# Patient Record
Sex: FEMALE | Race: WHITE | NOT HISPANIC OR LATINO | Employment: OTHER | URBAN - NONMETROPOLITAN AREA
[De-identification: names, ages, dates, MRNs, and addresses within clinical notes are randomized per-mention and may not be internally consistent; named-entity substitution may affect disease eponyms.]

---

## 2017-01-02 ENCOUNTER — HOSPITAL ENCOUNTER (OUTPATIENT)
Dept: GENERAL RADIOLOGY | Facility: HOSPITAL | Age: 52
Discharge: HOME | End: 2017-01-02
Attending: INTERNAL MEDICINE

## 2017-01-02 ENCOUNTER — TRANSCRIBE ORDERS (OUTPATIENT)
Dept: ADMINISTRATIVE | Facility: HOSPITAL | Age: 52
End: 2017-01-02

## 2017-01-02 DIAGNOSIS — M54.5 LOW BACK PAIN, UNSPECIFIED BACK PAIN LATERALITY, UNSPECIFIED CHRONICITY, WITH SCIATICA PRESENCE UNSPECIFIED: Primary | ICD-10-CM

## 2017-01-02 DIAGNOSIS — M54.5 LOW BACK PAIN, UNSPECIFIED BACK PAIN LATERALITY, UNSPECIFIED CHRONICITY, WITH SCIATICA PRESENCE UNSPECIFIED: ICD-10-CM

## 2017-01-03 ENCOUNTER — HOSPITAL ENCOUNTER (OUTPATIENT)
Dept: GENERAL RADIOLOGY | Facility: HOSPITAL | Age: 52
Discharge: HOME | End: 2017-01-03
Attending: INTERNAL MEDICINE | Admitting: INTERNAL MEDICINE

## 2017-01-03 DIAGNOSIS — M54.5 LOW BACK PAIN, UNSPECIFIED BACK PAIN LATERALITY, UNSPECIFIED CHRONICITY, WITH SCIATICA PRESENCE UNSPECIFIED: ICD-10-CM

## 2017-01-03 PROCEDURE — 72220 X-RAY EXAM SACRUM TAILBONE: CPT | Performed by: RADIOLOGY

## 2017-01-03 PROCEDURE — 72110 X-RAY EXAM L-2 SPINE 4/>VWS: CPT | Performed by: RADIOLOGY

## 2017-01-03 PROCEDURE — 72110 X-RAY EXAM L-2 SPINE 4/>VWS: CPT

## 2017-01-03 PROCEDURE — 72220 X-RAY EXAM SACRUM TAILBONE: CPT

## 2017-01-09 ENCOUNTER — TRANSCRIBE ORDERS (OUTPATIENT)
Dept: ADMINISTRATIVE | Facility: HOSPITAL | Age: 52
End: 2017-01-09

## 2017-01-09 DIAGNOSIS — S30.0XXA COCCYX CONTUSION, INITIAL ENCOUNTER: ICD-10-CM

## 2017-01-09 DIAGNOSIS — S30.0XXA PELVIC CONTUSION, INITIAL ENCOUNTER: Primary | ICD-10-CM

## 2017-01-10 ENCOUNTER — HOSPITAL ENCOUNTER (OUTPATIENT)
Dept: CT IMAGING | Facility: HOSPITAL | Age: 52
Discharge: HOME | End: 2017-01-10
Attending: INTERNAL MEDICINE | Admitting: INTERNAL MEDICINE

## 2017-01-10 DIAGNOSIS — S30.0XXA COCCYX CONTUSION, INITIAL ENCOUNTER: ICD-10-CM

## 2017-01-10 DIAGNOSIS — S30.0XXA PELVIC CONTUSION, INITIAL ENCOUNTER: ICD-10-CM

## 2017-01-10 PROCEDURE — 72192 CT PELVIS W/O DYE: CPT

## 2017-01-10 PROCEDURE — 72192 CT PELVIS W/O DYE: CPT | Performed by: RADIOLOGY

## 2017-03-23 ENCOUNTER — OFFICE VISIT (OUTPATIENT)
Dept: NEUROSURGERY | Facility: CLINIC | Age: 52
End: 2017-03-23

## 2017-03-23 VITALS
DIASTOLIC BLOOD PRESSURE: 103 MMHG | WEIGHT: 189 LBS | RESPIRATION RATE: 16 BRPM | SYSTOLIC BLOOD PRESSURE: 157 MMHG | OXYGEN SATURATION: 95 % | BODY MASS INDEX: 34.78 KG/M2 | HEART RATE: 94 BPM | HEIGHT: 62 IN | TEMPERATURE: 98.2 F

## 2017-03-23 DIAGNOSIS — S32.10XD SACRUM AND COCCYX FRACTURE, WITH ROUTINE HEALING, SUBSEQUENT ENCOUNTER: Primary | ICD-10-CM

## 2017-03-23 DIAGNOSIS — S32.2XXD SACRUM AND COCCYX FRACTURE, WITH ROUTINE HEALING, SUBSEQUENT ENCOUNTER: Primary | ICD-10-CM

## 2017-03-23 PROCEDURE — 99203 OFFICE O/P NEW LOW 30 MIN: CPT | Performed by: NEUROLOGICAL SURGERY

## 2017-03-23 RX ORDER — TRAMADOL HYDROCHLORIDE 50 MG/1
50 TABLET ORAL EVERY 6 HOURS PRN
Qty: 45 TABLET | Refills: 0 | Status: SHIPPED | OUTPATIENT
Start: 2017-03-23 | End: 2017-10-02 | Stop reason: ALTCHOICE

## 2017-03-23 RX ORDER — IBUPROFEN 200 MG
200 TABLET ORAL EVERY 6 HOURS PRN
COMMUNITY
End: 2018-07-20

## 2017-03-23 RX ORDER — PANTOPRAZOLE SODIUM 40 MG/1
40 TABLET, DELAYED RELEASE ORAL DAILY
COMMUNITY
End: 2017-10-02 | Stop reason: SDUPTHER

## 2017-03-23 RX ORDER — NAPROXEN 500 MG/1
500 TABLET ORAL 2 TIMES DAILY PRN
COMMUNITY

## 2017-03-23 RX ORDER — GABAPENTIN 800 MG/1
800 TABLET ORAL 3 TIMES DAILY
COMMUNITY
End: 2018-07-20

## 2017-03-23 NOTE — PROGRESS NOTES
Arelis Riggs  1965  [id number]      Chief Complaint   Patient presents with   • Back Pain       HISTORY OF PRESENT ILLNESS:  This is a 51-year-old female who fell and sustained a fracture dislocation of her coccyx.  The pain is axial and nonradicular.  She does have osteoporosis but has no spinal fracture.     Past Medical History:   Diagnosis Date   • Arthritis    • Asthma    • Fibromyalgia    • Headache    • Low back pain    • Osteoporosis        Past Surgical History:   Procedure Laterality Date   • CHOLECYSTECTOMY  1992   • ENDOMETRIAL ABLATION  1990   • SHOULDER ROTATOR CUFF REPAIR  2009   • TUBAL ABDOMINAL LIGATION         Family History   Problem Relation Age of Onset   • Cancer Mother    • Cancer Father        Social History     Social History   • Marital status:      Spouse name: N/A   • Number of children: N/A   • Years of education: N/A     Occupational History   • Not on file.     Social History Main Topics   • Smoking status: Current Every Day Smoker     Packs/day: 1.00   • Smokeless tobacco: Never Used   • Alcohol use Yes      Comment: occasionaly   • Drug use: No   • Sexual activity: Defer     Other Topics Concern   • Not on file     Social History Narrative   • No narrative on file       Allergies   Allergen Reactions   • Biaxin [Clarithromycin]    • Codeine          Current Outpatient Prescriptions:   •  gabapentin (NEURONTIN) 800 MG tablet, Take 800 mg by mouth 3 (Three) Times a Day., Disp: , Rfl:   •  ibuprofen (ADVIL,MOTRIN) 200 MG tablet, Take 200 mg by mouth Every 6 (Six) Hours As Needed for Mild Pain (1-3)., Disp: , Rfl:   •  naproxen (NAPROSYN) 500 MG tablet, Take 500 mg by mouth 2 (Two) Times a Day With Meals., Disp: , Rfl:   •  pantoprazole (PROTONIX) 40 MG EC tablet, Take 40 mg by mouth Daily., Disp: , Rfl:     Review of Systems   Constitutional: Positive for fatigue.   HENT: Positive for trouble swallowing.    Eyes: Positive for visual disturbance.   Respiratory: Positive for  "cough, shortness of breath and wheezing.    Gastrointestinal: Positive for constipation and nausea.   Endocrine: Positive for polydipsia.   Genitourinary: Positive for pelvic pain.   Musculoskeletal: Positive for arthralgias and back pain.   Allergic/Immunologic: Positive for food allergies.   Neurological: Positive for dizziness, seizures, light-headedness and headaches.   Hematological: Bruises/bleeds easily.   Psychiatric/Behavioral: Positive for dysphoric mood and sleep disturbance. The patient is nervous/anxious.        Neurological Examination:    Vitals:    03/23/17 0952   BP: (!) 157/103   BP Location: Right arm   Patient Position: Sitting   Pulse: 94   Resp: 16   Temp: 98.2 °F (36.8 °C)   SpO2: 95%   Weight: 189 lb (85.7 kg)   Height: 62\" (157.5 cm)       Mental status/speech: The patient is alert and oriented.  Speech is clear without aphysia or dysarthria.  No overt cognitive deficits.    Cranial nerve examination:    Olfaction: Smell is intact.  Vision: Vision is intact without visual field abnormalities.  Funduscopic examination is normal.  No pupillary irregularity.  Ocular motor examination: The extraocular muscles are intact.  There is no diplopia.  The pupil is round and reactive to both light and accommodation.  There is no nystagmus.  Facial movement/sensation: There is no facial weakness.  Sensation is intact in the first, second, and third divisions of the trigeminal nerve.  The corneal reflex is intact.  Auditory: Hearing is intact to finger rub bilaterally.  Cranial nerves IX, X, XI, XII: Phonation is normal.  No dysphagia.  Tongue is protruded in the midline without atrophy.  The gag reflex is intact.  Shoulder shrug is normal.    Musculoligamentous ligamentous examination: His decreased range of motion lumbar spine.  She is exquisitely tender to palpation of the coccyx.  I'm unable to detect weakness sensory loss or reflex asymmetry however.    Medical Decision Making:     Diagnostic Data " Set:  Her lumbar spine x-rays are essentially normal.  She has mild osteopenia but no evidence of compression fracture and/or dislocation.  The x-rays of the coccyx show a displaced fractured distal segment of her coccyx.      Assessment:  Fracture of the coccyx          Recommendations:  This is not a neurosurgical issue.  I would recommend referral her to orthopedist.  On an occasion resection of the distal tip of the coccyx may be beneficial.  I have had a couple of patients undergo that procedure quite successfully.  However, the indications are beyond my expertise.  I have given her prescription of tramadol.        I greatly appreciate the opportunity to see and evaluate this individual.  If you have questions or concerns regarding issues that I may have overlooked please call me at any time: [phone].  Bryan Dunaway M.D.  [facility]  [address] [place] [address].  Formerly [facility]  [id number]

## 2017-10-02 ENCOUNTER — HOSPITAL ENCOUNTER (OUTPATIENT)
Dept: GENERAL RADIOLOGY | Facility: HOSPITAL | Age: 52
Discharge: HOME | End: 2017-10-02
Admitting: PHYSICIAN ASSISTANT

## 2017-10-02 ENCOUNTER — CONSULT (OUTPATIENT)
Dept: GASTROENTEROLOGY | Facility: CLINIC | Age: 52
End: 2017-10-02

## 2017-10-02 ENCOUNTER — LAB (OUTPATIENT)
Dept: LAB | Facility: HOSPITAL | Age: 52
End: 2017-10-02

## 2017-10-02 ENCOUNTER — TELEPHONE (OUTPATIENT)
Dept: GASTROENTEROLOGY | Facility: CLINIC | Age: 52
End: 2017-10-02

## 2017-10-02 VITALS
DIASTOLIC BLOOD PRESSURE: 98 MMHG | SYSTOLIC BLOOD PRESSURE: 142 MMHG | OXYGEN SATURATION: 95 % | HEIGHT: 62 IN | WEIGHT: 179.6 LBS | BODY MASS INDEX: 33.05 KG/M2 | HEART RATE: 94 BPM

## 2017-10-02 DIAGNOSIS — R10.10 UPPER ABDOMINAL PAIN: ICD-10-CM

## 2017-10-02 DIAGNOSIS — R11.2 INTRACTABLE VOMITING WITH NAUSEA, UNSPECIFIED VOMITING TYPE: ICD-10-CM

## 2017-10-02 DIAGNOSIS — R19.7 DIARRHEA, UNSPECIFIED TYPE: ICD-10-CM

## 2017-10-02 DIAGNOSIS — Z86.010 HISTORY OF COLON POLYPS: ICD-10-CM

## 2017-10-02 DIAGNOSIS — K21.9 GASTROESOPHAGEAL REFLUX DISEASE, ESOPHAGITIS PRESENCE NOT SPECIFIED: ICD-10-CM

## 2017-10-02 DIAGNOSIS — E87.6 HYPOKALEMIA: Primary | ICD-10-CM

## 2017-10-02 DIAGNOSIS — R11.2 INTRACTABLE VOMITING WITH NAUSEA, UNSPECIFIED VOMITING TYPE: Primary | ICD-10-CM

## 2017-10-02 LAB
ALBUMIN SERPL-MCNC: 4.2 G/DL (ref 3.5–5)
ALBUMIN/GLOB SERPL: 1.1 G/DL (ref 1.5–2.5)
ALP SERPL-CCNC: 159 U/L (ref 35–104)
ALT SERPL W P-5'-P-CCNC: 59 U/L (ref 10–36)
ANION GAP SERPL CALCULATED.3IONS-SCNC: 5.9 MMOL/L (ref 3.6–11.2)
AST SERPL-CCNC: 46 U/L (ref 10–30)
BASOPHILS # BLD AUTO: 0.07 10*3/MM3 (ref 0–0.3)
BASOPHILS NFR BLD AUTO: 0.6 % (ref 0–2)
BILIRUB SERPL-MCNC: 0.5 MG/DL (ref 0.2–1.8)
BUN BLD-MCNC: 6 MG/DL (ref 7–21)
BUN/CREAT SERPL: 10.2 (ref 7–25)
CALCIUM SPEC-SCNC: 9.4 MG/DL (ref 7.7–10)
CHLORIDE SERPL-SCNC: 105 MMOL/L (ref 99–112)
CO2 SERPL-SCNC: 31.1 MMOL/L (ref 24.3–31.9)
CREAT BLD-MCNC: 0.59 MG/DL (ref 0.43–1.29)
DEPRECATED RDW RBC AUTO: 41.7 FL (ref 37–54)
EOSINOPHIL # BLD AUTO: 0.33 10*3/MM3 (ref 0–0.7)
EOSINOPHIL NFR BLD AUTO: 2.7 % (ref 0–5)
ERYTHROCYTE [DISTWIDTH] IN BLOOD BY AUTOMATED COUNT: 12.9 % (ref 11.5–14.5)
GFR SERPL CREATININE-BSD FRML MDRD: 107 ML/MIN/1.73
GLOBULIN UR ELPH-MCNC: 4 GM/DL
GLUCOSE BLD-MCNC: 95 MG/DL (ref 70–110)
HCT VFR BLD AUTO: 45.7 % (ref 37–47)
HGB BLD-MCNC: 16.2 G/DL (ref 12–16)
IMM GRANULOCYTES # BLD: 0.03 10*3/MM3 (ref 0–0.03)
IMM GRANULOCYTES NFR BLD: 0.2 % (ref 0–0.5)
LIPASE SERPL-CCNC: 27 U/L (ref 13–60)
LYMPHOCYTES # BLD AUTO: 3.18 10*3/MM3 (ref 1–3)
LYMPHOCYTES NFR BLD AUTO: 25.7 % (ref 21–51)
MCH RBC QN AUTO: 31.4 PG (ref 27–33)
MCHC RBC AUTO-ENTMCNC: 35.4 G/DL (ref 33–37)
MCV RBC AUTO: 88.6 FL (ref 80–94)
MONOCYTES # BLD AUTO: 1.3 10*3/MM3 (ref 0.1–0.9)
MONOCYTES NFR BLD AUTO: 10.5 % (ref 0–10)
NEUTROPHILS # BLD AUTO: 7.47 10*3/MM3 (ref 1.4–6.5)
NEUTROPHILS NFR BLD AUTO: 60.3 % (ref 30–70)
OSMOLALITY SERPL CALC.SUM OF ELEC: 280.5 MOSM/KG (ref 273–305)
PLATELET # BLD AUTO: 293 10*3/MM3 (ref 130–400)
PMV BLD AUTO: 9.6 FL (ref 6–10)
POTASSIUM BLD-SCNC: 3.3 MMOL/L (ref 3.5–5.3)
PROT SERPL-MCNC: 8.2 G/DL (ref 6–8)
RBC # BLD AUTO: 5.16 10*6/MM3 (ref 4.2–5.4)
SODIUM BLD-SCNC: 142 MMOL/L (ref 135–153)
WBC NRBC COR # BLD: 12.38 10*3/MM3 (ref 4.5–12.5)

## 2017-10-02 PROCEDURE — 99214 OFFICE O/P EST MOD 30 MIN: CPT | Performed by: PHYSICIAN ASSISTANT

## 2017-10-02 PROCEDURE — 74020 HC XR ABDOMEN FLAT & UPRIGHT: CPT

## 2017-10-02 PROCEDURE — 74000 XR ABDOMEN FLAT AND UPRIGHT: CPT | Performed by: RADIOLOGY

## 2017-10-02 PROCEDURE — 80053 COMPREHEN METABOLIC PANEL: CPT | Performed by: PHYSICIAN ASSISTANT

## 2017-10-02 PROCEDURE — 83690 ASSAY OF LIPASE: CPT | Performed by: PHYSICIAN ASSISTANT

## 2017-10-02 PROCEDURE — 36415 COLL VENOUS BLD VENIPUNCTURE: CPT

## 2017-10-02 PROCEDURE — 85025 COMPLETE CBC W/AUTO DIFF WBC: CPT | Performed by: PHYSICIAN ASSISTANT

## 2017-10-02 RX ORDER — PANTOPRAZOLE SODIUM 40 MG/1
40 TABLET, DELAYED RELEASE ORAL
Qty: 60 TABLET | Refills: 3 | Status: SHIPPED | OUTPATIENT
Start: 2017-10-02 | End: 2018-07-20

## 2017-10-02 RX ORDER — PROMETHAZINE HYDROCHLORIDE 12.5 MG/1
12.5 TABLET ORAL EVERY 8 HOURS PRN
COMMUNITY
End: 2020-10-11

## 2017-10-02 NOTE — PROGRESS NOTES
"Chief Complaint   Patient presents with   • Abdominal Pain   • Diarrhea   • Vomiting   • Nausea   • Black or Bloody Stool     Arelis Riggs is a 52 y.o. female who presents to the office today (established with ) as a consultation at the request of Jessica Humphrey MD for evaluation of GERD and history of multiple polyps.    HPI  She has had upper abdominal pain for several years and it has been progressively getting worse over the past 3 weeks. She has had diarrhea numerous times per day since the past 9 days. Vomiting has also been present and malodorous (smeels like stool per her report) since that time as well. Pain in the abdomen is described as \"cutting\" and severe. She does have history of EGD and colonoscopy about 5 years ago by Dr. Smith. She reports that she had 8 polyps at that time, otherwise normal. She was having similar problems then.     Some stools are dark in color. She does admit rectal bleeding in small amounts when constipated but not occurring now. There is no known family history of colon cancer or colon polyps. Cholecystectomy in past.     She is taking Protonix 40 mg once daily still with acid reflux symptoms at times. She is taking anti-diarrheals OTC which seems to help some. Aleve is taken only as needed. She no longer takes Tramadol.     Review of Systems   Constitutional: Positive for fatigue. Negative for chills and fever.   HENT: Positive for congestion and trouble swallowing. Negative for sore throat and voice change.    Eyes: Negative for pain and visual disturbance.   Respiratory: Positive for cough and shortness of breath.    Cardiovascular: Positive for palpitations and leg swelling. Negative for chest pain.   Gastrointestinal: Positive for abdominal distention, abdominal pain, anal bleeding, blood in stool, constipation, diarrhea, nausea and vomiting. Negative for rectal pain.   Endocrine: Positive for cold intolerance and heat intolerance.   Genitourinary: Positive for " "difficulty urinating and pelvic pain.   Musculoskeletal: Positive for back pain. Negative for arthralgias and myalgias.   Skin: Negative for rash and wound.   Allergic/Immunologic: Positive for environmental allergies and food allergies.   Neurological: Positive for headaches. Negative for dizziness.   Hematological: Bruises/bleeds easily.   Psychiatric/Behavioral: Positive for sleep disturbance. The patient is nervous/anxious.        Past Medical History:   Diagnosis Date   • Arthritis    • Asthma    • Fibromyalgia    • Headache    • Low back pain    • Osteoporosis        Past Surgical History:   Procedure Laterality Date   • CHOLECYSTECTOMY  1992   • COLONOSCOPY     • ENDOMETRIAL ABLATION  1990   • ENDOSCOPY     • SHOULDER ROTATOR CUFF REPAIR  2009   • TUBAL ABDOMINAL LIGATION         Family History   Problem Relation Age of Onset   • Cancer Mother    • Cancer Father    • Lung cancer Other        Social History   Substance Use Topics   • Smoking status: Current Every Day Smoker     Packs/day: 1.00   • Smokeless tobacco: Never Used   • Alcohol use No      Comment: occasionaly       CURRENT MEDICATION:  •  gabapentin (NEURONTIN) 800 MG tablet, Take 800 mg by mouth 3 (Three) Times a Day., Disp: , Rfl:   •  naproxen (NAPROSYN) 500 MG tablet, Take 500 mg by mouth 2 (Two) Times a Day With Meals., Disp: , Rfl:   •  pantoprazole (PROTONIX) 40 MG EC tablet, Take 40 mg by mouth Daily., Disp: , Rfl:   •  ibuprofen (ADVIL,MOTRIN) 200 MG tablet, Take 200 mg by mouth Every 6 (Six) Hours As Needed for Mild Pain (1-3)., Disp: , Rfl:   -  Phenergan as needed    ALLERGIES:  Biaxin [clarithromycin]; Codeine; Darvon [propoxyphene]; and Percocet [oxycodone-acetaminophen]    VISIT VITALS:  /98  Pulse 94  Ht 62\" (157.5 cm)  Wt 179 lb 9.6 oz (81.5 kg)  SpO2 95%  BMI 32.85 kg/m2    Physical Exam   Constitutional: She is oriented to person, place, and time. She appears well-developed and well-nourished. She appears distressed. "   HENT:   Head: Normocephalic and atraumatic.   Right Ear: External ear normal.   Left Ear: External ear normal.   Nose: Nose normal.   Mouth/Throat: Oropharynx is clear and moist.   Eyes: Conjunctivae and EOM are normal. Right eye exhibits no discharge. Left eye exhibits no discharge. No scleral icterus.   Neck: Normal range of motion. Neck supple.   Cardiovascular: Normal rate, regular rhythm and normal heart sounds.  Exam reveals no gallop and no friction rub.    No murmur heard.  Pulmonary/Chest: Effort normal and breath sounds normal. No respiratory distress. She has no wheezes. She has no rales. She exhibits no tenderness.   Abdominal: Soft. Normal appearance and bowel sounds are normal. She exhibits no distension, no ascites and no mass. There is tenderness (generalized but worst in epigastric region). There is no rigidity and no guarding. No hernia.   Musculoskeletal: Normal range of motion. She exhibits no edema or deformity.   Neurological: She is alert and oriented to person, place, and time. She exhibits normal muscle tone. Coordination normal.   Skin: Skin is warm and dry. No rash noted. No erythema. No pallor.   Psychiatric: She has a normal mood and affect. Her behavior is normal. Judgment and thought content normal.   Nursing note and vitals reviewed.      Assessment/Plan     1. Intractable vomiting with nausea, unspecified vomiting type    2. Diarrhea, unspecified type    3. History of colon polyps    4. Upper abdominal pain    5. Gastroesophageal reflux disease, esophagitis presence not specified        Orders Placed This Encounter   Procedures   • Clostridium Difficile Toxin, PCR - Stool, Per Rectum   • Stool Culture - Stool, Per Rectum   • Ova & Parasite Examination - Stool, Per Rectum   • XR Abdomen Flat & Upright   • Lipase   • Comprehensive Metabolic Panel     -  CBC with diff    Pending the results of aforementioned tests, she may need CT for further evaluation. Next step in her care will be  determined after they have been reviewed. Continue phenergan given by another provider as needed for nausea and vomiting.     We will increase Protonix to 40 mg BID 30 minutes before meals due to GERD.     She will need an esophagogastroduodenoscopy and colonoscopy performed with IV general sedation. All of the risks, benefits and alternatives of these procedures have been discussed with her, all of her questions have been answered and she has elected to proceed. She should follow up in the office after these procedures to discuss the results and further recommendations can be made at that time.        Return for follow up after procedure.       Alisa Hobbs PA-C       Electronically signed 10/2/2017 at 3:13 PM.

## 2017-10-03 ENCOUNTER — LAB (OUTPATIENT)
Dept: LAB | Facility: HOSPITAL | Age: 52
End: 2017-10-03

## 2017-10-03 DIAGNOSIS — R19.7 DIARRHEA, UNSPECIFIED TYPE: ICD-10-CM

## 2017-10-03 PROBLEM — R10.10 UPPER ABDOMINAL PAIN: Status: ACTIVE | Noted: 2017-10-03

## 2017-10-03 PROBLEM — R11.2 INTRACTABLE VOMITING WITH NAUSEA: Status: ACTIVE | Noted: 2017-10-03

## 2017-10-03 PROBLEM — Z86.010 HISTORY OF COLON POLYPS: Status: ACTIVE | Noted: 2017-10-03

## 2017-10-03 PROBLEM — K21.9 GASTROESOPHAGEAL REFLUX DISEASE: Status: ACTIVE | Noted: 2017-10-03

## 2017-10-03 LAB
027 TOXIN: NORMAL
C DIFF TOX GENS STL QL NAA+PROBE: NEGATIVE

## 2017-10-03 PROCEDURE — 87177 OVA AND PARASITES SMEARS: CPT | Performed by: PHYSICIAN ASSISTANT

## 2017-10-03 PROCEDURE — 87899 AGENT NOS ASSAY W/OPTIC: CPT | Performed by: PHYSICIAN ASSISTANT

## 2017-10-03 PROCEDURE — 87209 SMEAR COMPLEX STAIN: CPT | Performed by: PHYSICIAN ASSISTANT

## 2017-10-03 PROCEDURE — 87046 STOOL CULTR AEROBIC BACT EA: CPT | Performed by: PHYSICIAN ASSISTANT

## 2017-10-03 PROCEDURE — 87045 FECES CULTURE AEROBIC BACT: CPT | Performed by: PHYSICIAN ASSISTANT

## 2017-10-03 PROCEDURE — 87493 C DIFF AMPLIFIED PROBE: CPT | Performed by: PHYSICIAN ASSISTANT

## 2017-10-05 LAB
BACTERIA SPEC AEROBE CULT: NORMAL
O+P SPEC MICRO: NORMAL
OVA + PARASITE RESULT 1: NORMAL

## 2017-10-06 ENCOUNTER — DOCUMENTATION (OUTPATIENT)
Dept: GASTROENTEROLOGY | Facility: CLINIC | Age: 52
End: 2017-10-06

## 2017-10-06 NOTE — PROGRESS NOTES
Spoke with patient and told her X-ray results and Lab results. Pt. stated that she still felt bad and is she did not feel better soon she was encouraged to go to ER. Pt. Voiced understanding.

## 2017-10-09 RX ORDER — POTASSIUM CHLORIDE 20 MEQ/1
20 TABLET, EXTENDED RELEASE ORAL 2 TIMES DAILY
Qty: 14 TABLET | Refills: 0 | Status: SHIPPED | OUTPATIENT
Start: 2017-10-09 | End: 2018-07-20

## 2018-06-24 ENCOUNTER — HOSPITAL ENCOUNTER (EMERGENCY)
Facility: HOSPITAL | Age: 53
Discharge: HOME | End: 2018-06-24
Admitting: INTERNAL MEDICINE

## 2018-06-24 ENCOUNTER — APPOINTMENT (OUTPATIENT)
Dept: CT IMAGING | Facility: HOSPITAL | Age: 53
End: 2018-06-24

## 2018-06-24 VITALS
TEMPERATURE: 97.9 F | HEART RATE: 75 BPM | BODY MASS INDEX: 33.13 KG/M2 | HEIGHT: 62 IN | RESPIRATION RATE: 16 BRPM | OXYGEN SATURATION: 96 % | WEIGHT: 180 LBS | DIASTOLIC BLOOD PRESSURE: 72 MMHG | SYSTOLIC BLOOD PRESSURE: 120 MMHG

## 2018-06-24 DIAGNOSIS — R51.9 ACUTE NONINTRACTABLE HEADACHE, UNSPECIFIED HEADACHE TYPE: Primary | ICD-10-CM

## 2018-06-24 LAB
6-ACETYL MORPHINE: NEGATIVE
ALBUMIN SERPL-MCNC: 4 G/DL (ref 3.5–5)
ALBUMIN/GLOB SERPL: 0.9 G/DL (ref 1.5–2.5)
ALP SERPL-CCNC: 182 U/L (ref 35–104)
ALT SERPL W P-5'-P-CCNC: 114 U/L (ref 10–36)
AMPHET+METHAMPHET UR QL: NEGATIVE
ANION GAP SERPL CALCULATED.3IONS-SCNC: 5.4 MMOL/L (ref 3.6–11.2)
AST SERPL-CCNC: 87 U/L (ref 10–30)
BARBITURATES UR QL SCN: NEGATIVE
BASOPHILS # BLD AUTO: 0.07 10*3/MM3 (ref 0–0.3)
BASOPHILS NFR BLD AUTO: 0.7 % (ref 0–2)
BENZODIAZ UR QL SCN: NEGATIVE
BILIRUB SERPL-MCNC: 0.5 MG/DL (ref 0.2–1.8)
BUN BLD-MCNC: 8 MG/DL (ref 7–21)
BUN/CREAT SERPL: 10.4 (ref 7–25)
BUPRENORPHINE SERPL-MCNC: NEGATIVE NG/ML
CALCIUM SPEC-SCNC: 9.1 MG/DL (ref 7.7–10)
CANNABINOIDS SERPL QL: NEGATIVE
CHLORIDE SERPL-SCNC: 103 MMOL/L (ref 99–112)
CO2 SERPL-SCNC: 29.6 MMOL/L (ref 24.3–31.9)
COCAINE UR QL: NEGATIVE
CREAT BLD-MCNC: 0.77 MG/DL (ref 0.43–1.29)
DEPRECATED RDW RBC AUTO: 41.3 FL (ref 37–54)
EOSINOPHIL # BLD AUTO: 0.36 10*3/MM3 (ref 0–0.7)
EOSINOPHIL NFR BLD AUTO: 3.8 % (ref 0–5)
ERYTHROCYTE [DISTWIDTH] IN BLOOD BY AUTOMATED COUNT: 12.4 % (ref 11.5–14.5)
ETHANOL BLD-MCNC: <10 MG/DL
ETHANOL UR QL: <0.01 %
GFR SERPL CREATININE-BSD FRML MDRD: 79 ML/MIN/1.73
GLOBULIN UR ELPH-MCNC: 4.3 GM/DL
GLUCOSE BLD-MCNC: 97 MG/DL (ref 70–110)
HAV IGM SERPL QL IA: ABNORMAL
HBV CORE IGM SERPL QL IA: ABNORMAL
HBV SURFACE AG SERPL QL IA: ABNORMAL
HCT VFR BLD AUTO: 46.6 % (ref 37–47)
HCV AB SER DONR QL: REACTIVE
HGB BLD-MCNC: 16 G/DL (ref 12–16)
IMM GRANULOCYTES # BLD: 0.02 10*3/MM3 (ref 0–0.03)
IMM GRANULOCYTES NFR BLD: 0.2 % (ref 0–0.5)
LYMPHOCYTES # BLD AUTO: 2.52 10*3/MM3 (ref 1–3)
LYMPHOCYTES NFR BLD AUTO: 26.4 % (ref 21–51)
MCH RBC QN AUTO: 31.7 PG (ref 27–33)
MCHC RBC AUTO-ENTMCNC: 34.3 G/DL (ref 33–37)
MCV RBC AUTO: 92.3 FL (ref 80–94)
METHADONE UR QL SCN: NEGATIVE
MONOCYTES # BLD AUTO: 1.09 10*3/MM3 (ref 0.1–0.9)
MONOCYTES NFR BLD AUTO: 11.4 % (ref 0–10)
NEUTROPHILS # BLD AUTO: 5.48 10*3/MM3 (ref 1.4–6.5)
NEUTROPHILS NFR BLD AUTO: 57.5 % (ref 30–70)
OPIATES UR QL: NEGATIVE
OSMOLALITY SERPL CALC.SUM OF ELEC: 273.9 MOSM/KG (ref 273–305)
OXYCODONE UR QL SCN: NEGATIVE
PCP UR QL SCN: NEGATIVE
PLATELET # BLD AUTO: 284 10*3/MM3 (ref 130–400)
PMV BLD AUTO: 9.7 FL (ref 6–10)
POTASSIUM BLD-SCNC: 3.5 MMOL/L (ref 3.5–5.3)
PROT SERPL-MCNC: 8.3 G/DL (ref 6–8)
RBC # BLD AUTO: 5.05 10*6/MM3 (ref 4.2–5.4)
SODIUM BLD-SCNC: 138 MMOL/L (ref 135–153)
WBC NRBC COR # BLD: 9.54 10*3/MM3 (ref 4.5–12.5)

## 2018-06-24 PROCEDURE — 96374 THER/PROPH/DIAG INJ IV PUSH: CPT

## 2018-06-24 PROCEDURE — 25010000002 PROCHLORPERAZINE EDISYLATE PER 10 MG: Performed by: NURSE PRACTITIONER

## 2018-06-24 PROCEDURE — 70450 CT HEAD/BRAIN W/O DYE: CPT

## 2018-06-24 PROCEDURE — 80074 ACUTE HEPATITIS PANEL: CPT | Performed by: NURSE PRACTITIONER

## 2018-06-24 PROCEDURE — 85025 COMPLETE CBC W/AUTO DIFF WBC: CPT | Performed by: NURSE PRACTITIONER

## 2018-06-24 PROCEDURE — 96361 HYDRATE IV INFUSION ADD-ON: CPT

## 2018-06-24 PROCEDURE — 80307 DRUG TEST PRSMV CHEM ANLYZR: CPT | Performed by: NURSE PRACTITIONER

## 2018-06-24 PROCEDURE — 96376 TX/PRO/DX INJ SAME DRUG ADON: CPT

## 2018-06-24 PROCEDURE — 99283 EMERGENCY DEPT VISIT LOW MDM: CPT

## 2018-06-24 PROCEDURE — 25010000002 KETOROLAC TROMETHAMINE PER 15 MG: Performed by: NURSE PRACTITIONER

## 2018-06-24 PROCEDURE — 96375 TX/PRO/DX INJ NEW DRUG ADDON: CPT

## 2018-06-24 PROCEDURE — 80053 COMPREHEN METABOLIC PANEL: CPT | Performed by: NURSE PRACTITIONER

## 2018-06-24 PROCEDURE — 70450 CT HEAD/BRAIN W/O DYE: CPT | Performed by: RADIOLOGY

## 2018-06-24 PROCEDURE — 25010000002 HYDROMORPHONE PER 4 MG: Performed by: NURSE PRACTITIONER

## 2018-06-24 RX ORDER — HYDROMORPHONE HCL 110MG/55ML
0.5 PATIENT CONTROLLED ANALGESIA SYRINGE INTRAVENOUS ONCE
Status: COMPLETED | OUTPATIENT
Start: 2018-06-24 | End: 2018-06-24

## 2018-06-24 RX ORDER — BUTALBITAL, ACETAMINOPHEN AND CAFFEINE 50; 325; 40 MG/1; MG/1; MG/1
1 TABLET ORAL EVERY 6 HOURS PRN
Qty: 12 TABLET | Refills: 0 | Status: SHIPPED | OUTPATIENT
Start: 2018-06-24 | End: 2018-07-20

## 2018-06-24 RX ORDER — SODIUM CHLORIDE 0.9 % (FLUSH) 0.9 %
10 SYRINGE (ML) INJECTION AS NEEDED
Status: DISCONTINUED | OUTPATIENT
Start: 2018-06-24 | End: 2018-06-24 | Stop reason: HOSPADM

## 2018-06-24 RX ORDER — KETOROLAC TROMETHAMINE 30 MG/ML
30 INJECTION, SOLUTION INTRAMUSCULAR; INTRAVENOUS ONCE
Status: COMPLETED | OUTPATIENT
Start: 2018-06-24 | End: 2018-06-24

## 2018-06-24 RX ADMIN — KETOROLAC TROMETHAMINE 30 MG: 30 INJECTION, SOLUTION INTRAMUSCULAR; INTRAVENOUS at 13:02

## 2018-06-24 RX ADMIN — HYDROMORPHONE HYDROCHLORIDE 0.5 MG: 2 INJECTION, SOLUTION INTRAMUSCULAR; INTRAVENOUS; SUBCUTANEOUS at 15:31

## 2018-06-24 RX ADMIN — SODIUM CHLORIDE 500 ML: 9 INJECTION, SOLUTION INTRAVENOUS at 13:00

## 2018-06-24 RX ADMIN — HYDROMORPHONE HYDROCHLORIDE 0.5 MG: 2 INJECTION, SOLUTION INTRAMUSCULAR; INTRAVENOUS; SUBCUTANEOUS at 16:05

## 2018-06-24 RX ADMIN — PROCHLORPERAZINE EDISYLATE 5 MG: 5 INJECTION INTRAMUSCULAR; INTRAVENOUS at 13:00

## 2018-06-25 ENCOUNTER — EPISODE CHANGES (OUTPATIENT)
Dept: CASE MANAGEMENT | Facility: OTHER | Age: 53
End: 2018-06-25

## 2018-07-15 ENCOUNTER — HOSPITAL ENCOUNTER (EMERGENCY)
Facility: HOSPITAL | Age: 53
Discharge: HOME | End: 2018-07-15
Attending: FAMILY MEDICINE | Admitting: FAMILY MEDICINE

## 2018-07-15 VITALS
HEART RATE: 97 BPM | SYSTOLIC BLOOD PRESSURE: 145 MMHG | HEIGHT: 62 IN | BODY MASS INDEX: 33.13 KG/M2 | RESPIRATION RATE: 22 BRPM | WEIGHT: 180 LBS | DIASTOLIC BLOOD PRESSURE: 87 MMHG | OXYGEN SATURATION: 96 % | TEMPERATURE: 98.3 F

## 2018-07-15 DIAGNOSIS — H92.02 EAR PAIN, LEFT: Primary | ICD-10-CM

## 2018-07-15 PROCEDURE — 99283 EMERGENCY DEPT VISIT LOW MDM: CPT

## 2018-07-15 RX ORDER — NEOMYCIN SULFATE, POLYMYXIN B SULFATE AND HYDROCORTISONE 10; 3.5; 1 MG/ML; MG/ML; [USP'U]/ML
3 SUSPENSION/ DROPS AURICULAR (OTIC) ONCE
Status: COMPLETED | OUTPATIENT
Start: 2018-07-15 | End: 2018-07-15

## 2018-07-15 RX ADMIN — NEOMYCIN SULFATE, POLYMYXIN B SULFATE AND HYDROCORTISONE 3 DROP: 3.5; 10000; 1 SUSPENSION AURICULAR (OTIC) at 22:22

## 2018-07-16 NOTE — ED PROVIDER NOTES
Subjective     History provided by:  Patient  Earache   Location:  Left  Behind ear:  No abnormality  Quality:  Sore  Severity:  Mild  Onset quality:  Sudden  Duration:  1 hour  Timing:  Constant  Progression:  Unchanged  Context: foreign body (Pt thinks she has a bug in her ear. )    Ineffective treatments: flushing with water.  Associated symptoms: no abdominal pain and no fever        Review of Systems   Constitutional: Negative.  Negative for fever.   HENT: Positive for ear pain.    Respiratory: Negative.    Cardiovascular: Negative.  Negative for chest pain.   Gastrointestinal: Negative.  Negative for abdominal pain.   Endocrine: Negative.    Genitourinary: Negative.  Negative for dysuria.   Skin: Negative.    Neurological: Negative.    Psychiatric/Behavioral: Negative.    All other systems reviewed and are negative.      Past Medical History:   Diagnosis Date   • Arthritis    • Asthma    • Fibromyalgia    • Headache    • Low back pain    • Osteoporosis        Allergies   Allergen Reactions   • Biaxin [Clarithromycin]    • Codeine    • Darvon [Propoxyphene]    • Percocet [Oxycodone-Acetaminophen]        Past Surgical History:   Procedure Laterality Date   • CHOLECYSTECTOMY  1992   • COLONOSCOPY     • ENDOMETRIAL ABLATION  1990   • ENDOSCOPY     • SHOULDER ROTATOR CUFF REPAIR  2009   • TUBAL ABDOMINAL LIGATION         Family History   Problem Relation Age of Onset   • Cancer Mother    • Cancer Father    • Lung cancer Other        Social History     Social History   • Marital status:      Social History Main Topics   • Smoking status: Current Every Day Smoker     Packs/day: 1.00   • Smokeless tobacco: Never Used   • Alcohol use No      Comment: occasionaly   • Drug use: No   • Sexual activity: Defer     Other Topics Concern   • Not on file           Objective   Physical Exam   Constitutional: She is oriented to person, place, and time. She appears well-developed and well-nourished. No distress.   HENT:    Head: Normocephalic and atraumatic.   Right Ear: External ear normal.   Left Ear: External ear normal.   Nose: Nose normal.   Eyes: Conjunctivae are normal.   Neck: Normal range of motion. Neck supple. No JVD present. No tracheal deviation present.   Cardiovascular: Normal rate.    No murmur heard.  Pulmonary/Chest: Effort normal. No respiratory distress. She has no wheezes.   Abdominal: Soft. There is no tenderness.   Musculoskeletal: Normal range of motion. She exhibits no edema or deformity.   Neurological: She is alert and oriented to person, place, and time. No cranial nerve deficit.   Skin: Skin is warm and dry. No rash noted. She is not diaphoretic. No erythema. No pallor.   Psychiatric: She has a normal mood and affect. Her behavior is normal. Thought content normal.   Nursing note and vitals reviewed.      Procedures           ED Course  ED Course as of Jul 15 2212   Sun Jul 15, 2018   2209 No findings on exam of foreign-body in patient's left ear.  Patient is complaining of sensation.  We'll treat with some Cortisporin otic to see if symptoms resolve.  [RB]      ED Course User Index  [RB] ALCON Espitia                  MDM  Number of Diagnoses or Management Options  Ear pain, left: new and does not require workup  Risk of Complications, Morbidity, and/or Mortality  Presenting problems: low  Diagnostic procedures: low  Management options: low    Patient Progress  Patient progress: stable        Final diagnoses:   Ear pain, left            ALCON Espitia  07/15/18 [id number]

## 2018-07-16 NOTE — ED NOTES
"Patient complaining that something is in her left ear, patient states \" I can feel it moving around in their, I tried to drowned it with water but its still alive crawling around in their, hurry you have to get it out\". No foreign bodies visualized in either of patients ears, informed patient of findings patient states \"oh ok maybe it flew out, I guess im ok than\".      Suzanna Zuniga, RN  07/15/18 2718    "

## 2018-07-20 ENCOUNTER — HOSPITAL ENCOUNTER (INPATIENT)
Facility: HOSPITAL | Age: 53
LOS: 1 days | Discharge: LEFT BEFORE DISCHARGE | End: 2018-07-21
Attending: INTERNAL MEDICINE | Admitting: INTERNAL MEDICINE

## 2018-07-20 ENCOUNTER — APPOINTMENT (OUTPATIENT)
Dept: CT IMAGING | Facility: HOSPITAL | Age: 53
End: 2018-07-20

## 2018-07-20 ENCOUNTER — APPOINTMENT (OUTPATIENT)
Dept: GENERAL RADIOLOGY | Facility: HOSPITAL | Age: 53
End: 2018-07-20

## 2018-07-20 DIAGNOSIS — R07.9 CHEST PAIN, UNSPECIFIED TYPE: Primary | ICD-10-CM

## 2018-07-20 LAB
ALBUMIN SERPL-MCNC: 4.2 G/DL (ref 3.5–5)
ALBUMIN/GLOB SERPL: 1 G/DL (ref 1.5–2.5)
ALP SERPL-CCNC: 220 U/L (ref 35–104)
ALT SERPL W P-5'-P-CCNC: 164 U/L (ref 10–36)
ANION GAP SERPL CALCULATED.3IONS-SCNC: 8.8 MMOL/L (ref 3.6–11.2)
AST SERPL-CCNC: 138 U/L (ref 10–30)
BASOPHILS # BLD AUTO: 0.09 10*3/MM3 (ref 0–0.3)
BASOPHILS NFR BLD AUTO: 0.6 % (ref 0–2)
BILIRUB SERPL-MCNC: 0.7 MG/DL (ref 0.2–1.8)
BNP SERPL-MCNC: 17 PG/ML (ref 0–100)
BUN BLD-MCNC: 9 MG/DL (ref 7–21)
BUN/CREAT SERPL: 11.5 (ref 7–25)
CALCIUM SPEC-SCNC: 9.6 MG/DL (ref 7.7–10)
CHLORIDE SERPL-SCNC: 105 MMOL/L (ref 99–112)
CO2 SERPL-SCNC: 25.2 MMOL/L (ref 24.3–31.9)
CREAT BLD-MCNC: 0.78 MG/DL (ref 0.43–1.29)
D DIMER PPP FEU-MCNC: 0.72 MCGFEU/ML (ref 0–0.5)
D-LACTATE SERPL-SCNC: 1.5 MMOL/L (ref 0.5–2)
DEPRECATED RDW RBC AUTO: 41.9 FL (ref 37–54)
EOSINOPHIL # BLD AUTO: 0.47 10*3/MM3 (ref 0–0.7)
EOSINOPHIL NFR BLD AUTO: 3.3 % (ref 0–5)
ERYTHROCYTE [DISTWIDTH] IN BLOOD BY AUTOMATED COUNT: 12.6 % (ref 11.5–14.5)
GFR SERPL CREATININE-BSD FRML MDRD: 78 ML/MIN/1.73
GLOBULIN UR ELPH-MCNC: 4.4 GM/DL
GLUCOSE BLD-MCNC: 102 MG/DL (ref 70–110)
HCT VFR BLD AUTO: 50.6 % (ref 37–47)
HGB BLD-MCNC: 17.7 G/DL (ref 12–16)
IMM GRANULOCYTES # BLD: 0.04 10*3/MM3 (ref 0–0.03)
IMM GRANULOCYTES NFR BLD: 0.3 % (ref 0–0.5)
INR PPP: 1.03 (ref 0.9–1.1)
LYMPHOCYTES # BLD AUTO: 2.46 10*3/MM3 (ref 1–3)
LYMPHOCYTES NFR BLD AUTO: 17.1 % (ref 21–51)
MCH RBC QN AUTO: 32.1 PG (ref 27–33)
MCHC RBC AUTO-ENTMCNC: 35 G/DL (ref 33–37)
MCV RBC AUTO: 91.7 FL (ref 80–94)
MONOCYTES # BLD AUTO: 1.35 10*3/MM3 (ref 0.1–0.9)
MONOCYTES NFR BLD AUTO: 9.4 % (ref 0–10)
NEUTROPHILS # BLD AUTO: 9.98 10*3/MM3 (ref 1.4–6.5)
NEUTROPHILS NFR BLD AUTO: 69.3 % (ref 30–70)
OSMOLALITY SERPL CALC.SUM OF ELEC: 276.4 MOSM/KG (ref 273–305)
PLATELET # BLD AUTO: 313 10*3/MM3 (ref 130–400)
PMV BLD AUTO: 10 FL (ref 6–10)
POTASSIUM BLD-SCNC: 3.6 MMOL/L (ref 3.5–5.3)
PROT SERPL-MCNC: 8.6 G/DL (ref 6–8)
PROTHROMBIN TIME: 13.8 SECONDS (ref 11–15.4)
RBC # BLD AUTO: 5.52 10*6/MM3 (ref 4.2–5.4)
SODIUM BLD-SCNC: 139 MMOL/L (ref 135–153)
TROPONIN I SERPL-MCNC: 0.01 NG/ML
TROPONIN I SERPL-MCNC: <0.006 NG/ML
TSH SERPL DL<=0.05 MIU/L-ACNC: 0.81 MIU/ML (ref 0.55–4.78)
WBC NRBC COR # BLD: 14.39 10*3/MM3 (ref 4.5–12.5)

## 2018-07-20 PROCEDURE — 71045 X-RAY EXAM CHEST 1 VIEW: CPT | Performed by: RADIOLOGY

## 2018-07-20 PROCEDURE — 80053 COMPREHEN METABOLIC PANEL: CPT | Performed by: INTERNAL MEDICINE

## 2018-07-20 PROCEDURE — 85610 PROTHROMBIN TIME: CPT | Performed by: INTERNAL MEDICINE

## 2018-07-20 PROCEDURE — 84484 ASSAY OF TROPONIN QUANT: CPT | Performed by: INTERNAL MEDICINE

## 2018-07-20 PROCEDURE — 99285 EMERGENCY DEPT VISIT HI MDM: CPT

## 2018-07-20 PROCEDURE — 71275 CT ANGIOGRAPHY CHEST: CPT | Performed by: RADIOLOGY

## 2018-07-20 PROCEDURE — 71045 X-RAY EXAM CHEST 1 VIEW: CPT

## 2018-07-20 PROCEDURE — 85379 FIBRIN DEGRADATION QUANT: CPT | Performed by: INTERNAL MEDICINE

## 2018-07-20 PROCEDURE — 83880 ASSAY OF NATRIURETIC PEPTIDE: CPT | Performed by: INTERNAL MEDICINE

## 2018-07-20 PROCEDURE — G0378 HOSPITAL OBSERVATION PER HR: HCPCS

## 2018-07-20 PROCEDURE — 25010000002 KETOROLAC TROMETHAMINE PER 15 MG: Performed by: INTERNAL MEDICINE

## 2018-07-20 PROCEDURE — 71275 CT ANGIOGRAPHY CHEST: CPT

## 2018-07-20 PROCEDURE — 0 IOPAMIDOL PER 1 ML: Performed by: INTERNAL MEDICINE

## 2018-07-20 PROCEDURE — 83605 ASSAY OF LACTIC ACID: CPT | Performed by: INTERNAL MEDICINE

## 2018-07-20 PROCEDURE — 93005 ELECTROCARDIOGRAM TRACING: CPT | Performed by: INTERNAL MEDICINE

## 2018-07-20 PROCEDURE — 87040 BLOOD CULTURE FOR BACTERIA: CPT | Performed by: INTERNAL MEDICINE

## 2018-07-20 PROCEDURE — 84443 ASSAY THYROID STIM HORMONE: CPT | Performed by: INTERNAL MEDICINE

## 2018-07-20 PROCEDURE — 25010000002 ORPHENADRINE CITRATE PER 60 MG: Performed by: INTERNAL MEDICINE

## 2018-07-20 PROCEDURE — 85025 COMPLETE CBC W/AUTO DIFF WBC: CPT | Performed by: INTERNAL MEDICINE

## 2018-07-20 RX ORDER — TRAMADOL HYDROCHLORIDE 50 MG/1
50 TABLET ORAL ONCE
Status: COMPLETED | OUTPATIENT
Start: 2018-07-20 | End: 2018-07-20

## 2018-07-20 RX ORDER — OMEPRAZOLE 40 MG/1
40 CAPSULE, DELAYED RELEASE ORAL DAILY
COMMUNITY

## 2018-07-20 RX ORDER — ASPIRIN 81 MG/1
324 TABLET, CHEWABLE ORAL ONCE
Status: COMPLETED | OUTPATIENT
Start: 2018-07-20 | End: 2018-07-20

## 2018-07-20 RX ORDER — KETOROLAC TROMETHAMINE 30 MG/ML
30 INJECTION, SOLUTION INTRAMUSCULAR; INTRAVENOUS EVERY 6 HOURS PRN
Status: DISCONTINUED | OUTPATIENT
Start: 2018-07-20 | End: 2018-07-21 | Stop reason: HOSPADM

## 2018-07-20 RX ORDER — ORPHENADRINE CITRATE 30 MG/ML
60 INJECTION INTRAMUSCULAR; INTRAVENOUS ONCE
Status: COMPLETED | OUTPATIENT
Start: 2018-07-20 | End: 2018-07-20

## 2018-07-20 RX ORDER — KETOROLAC TROMETHAMINE 30 MG/ML
15 INJECTION, SOLUTION INTRAMUSCULAR; INTRAVENOUS ONCE
Status: COMPLETED | OUTPATIENT
Start: 2018-07-20 | End: 2018-07-20

## 2018-07-20 RX ORDER — TRAMADOL HYDROCHLORIDE 50 MG/1
50 TABLET ORAL EVERY 6 HOURS PRN
Status: DISCONTINUED | OUTPATIENT
Start: 2018-07-20 | End: 2018-07-21 | Stop reason: HOSPADM

## 2018-07-20 RX ORDER — PANTOPRAZOLE SODIUM 40 MG/1
40 TABLET, DELAYED RELEASE ORAL
Status: DISCONTINUED | OUTPATIENT
Start: 2018-07-21 | End: 2018-07-21 | Stop reason: HOSPADM

## 2018-07-20 RX ORDER — CYCLOBENZAPRINE HCL 10 MG
10 TABLET ORAL EVERY 8 HOURS SCHEDULED
Status: DISCONTINUED | OUTPATIENT
Start: 2018-07-20 | End: 2018-07-21 | Stop reason: HOSPADM

## 2018-07-20 RX ORDER — ORPHENADRINE CITRATE 30 MG/ML
60 INJECTION INTRAMUSCULAR; INTRAVENOUS ONCE
Status: DISCONTINUED | OUTPATIENT
Start: 2018-07-20 | End: 2018-07-20

## 2018-07-20 RX ADMIN — SODIUM CHLORIDE 1000 ML: 9 INJECTION, SOLUTION INTRAVENOUS at 14:40

## 2018-07-20 RX ADMIN — TRAMADOL HYDROCHLORIDE 50 MG: 50 TABLET, COATED ORAL at 13:50

## 2018-07-20 RX ADMIN — ASPIRIN 324 MG: 81 TABLET, CHEWABLE ORAL at 14:51

## 2018-07-20 RX ADMIN — IOPAMIDOL 100 ML: 755 INJECTION, SOLUTION INTRAVENOUS at 12:59

## 2018-07-20 RX ADMIN — ORPHENADRINE CITRATE 60 MG: 30 INJECTION INTRAMUSCULAR; INTRAVENOUS at 13:55

## 2018-07-20 RX ADMIN — TRAMADOL HYDROCHLORIDE 50 MG: 50 TABLET, COATED ORAL at 20:22

## 2018-07-20 RX ADMIN — KETOROLAC TROMETHAMINE 15 MG: 30 INJECTION, SOLUTION INTRAMUSCULAR; INTRAVENOUS at 11:25

## 2018-07-20 RX ADMIN — SODIUM CHLORIDE 1000 ML: 9 INJECTION, SOLUTION INTRAVENOUS at 11:25

## 2018-07-20 NOTE — CONSULTS
Referring Provider: Dr Milagro Bautista for Consultation: -Chest pain    Chief complaint     Chest pain      History of present illness:      52-year-old woman with no significant cardiac illness has been admitted with history of chest pain.    Chest pain- started on the back of her left chest 2 days ago and then spread to the front no located in the left anterior region below the left breast and is associated with local tenderness.  The chest pain has been continuous with variable intensity and not related to breathing or exertion.  No history of definite aggravating or relieving factors.  The chest pain is atypical for angina.  Cardiac markers ×2 were negative for MI.  ECG showed sinus tachycardia with a heart rate 135 bpm and no evidence of ischemia.  CT of the chest was negative for PE.  There was evidence of emphysema and chronic interstitial lung disease.  No acute cardiopulmonary disease was seen.    No history of known cardiac illness.  No history of diabetes mellitus, hypertension or hyperlipidemia.  Patient is a smoker.  No history of fall: Or substance abuse.  She has history of GERD and takes Prilosec.  History of fibromyalgia.      Review of Systems     Review of father organ systems were done.  Essentially as above.    History  Past Medical History:   Diagnosis Date   • Anxiety    • Arthritis    • Asthma    • Fibromyalgia    • Headache    • Osteoporosis    , Past Surgical History:   Procedure Laterality Date   • CHOLECYSTECTOMY  1992   • COLONOSCOPY     • ENDOMETRIAL ABLATION  1990   • ENDOSCOPY     • SHOULDER ROTATOR CUFF REPAIR  2009   • TUBAL ABDOMINAL LIGATION     , Family History   Problem Relation Age of Onset   • Cancer Mother    • Cancer Father    • Lung cancer Other    • Coronary artery disease Sister    , Social History   Substance Use Topics   • Smoking status: Current Every Day Smoker     Packs/day: 1.00   • Smokeless tobacco: Never Used   • Alcohol use No      Comment: occasionaly  "  ,     Medication Review: Illicit 20 mg by mouth daily      cyclobenzaprine 10 mg Oral Q8H   [START ON 7/21/2018] pantoprazole 40 mg Oral Q AM        Allergies:  Biaxin [clarithromycin]; Codeine; Darvon [propoxyphene]; and Percocet [oxycodone-acetaminophen]     Social history-no history of smoking while Natalie substance abuse    Family history-noncontributory    Objective     Vital Sign Min/Max for last 24 hours  Temp  Min: 98 °F (36.7 °C)  Max: 98.1 °F (36.7 °C)   BP  Min: 134/109  Max: 180/91   Pulse  Min: 84  Max: 128   Resp  Min: 20  Max: 20   SpO2  Min: 95 %  Max: 99 %   No Data Recorded   Weight  Min: 81.6 kg (180 lb)  Max: 91 kg (200 lb 11.2 oz)     Flowsheet Rows      First Filed Value   Admission Height  157.5 cm (62\") Documented at 07/20/2018 1107   Admission Weight  81.6 kg (180 lb) Documented at 07/20/2018 1107             Physical Exam:     General Appearance:    Alert, cooperative, in no acute distress   Head:    Normocephalic, without obvious abnormality, atraumatic   Eyes:            Lids and lashes normal, conjunctivae and sclerae normal, no   icterus, no pallor, corneas clear, PERRLA   Ears:    Ears appear intact with no abnormalities noted   Throat:   No oral lesions, no thrush, oral mucosa moist   Neck:   No adenopathy, supple, trachea midline, no thyromegaly, no   carotid bruit, no JVD   Back:     No kyphosis present, no scoliosis present, no skin lesions,      erythema or scars, no tenderness to percussion or                   palpation,   range of motion normal   Lungs:     Clear to auscultation,respirations regular, even and                  unlabored    Heart:  Tachycardia.  Regular rhythm.  No murmurs.     Chest Wall:    No abnormalities observed area tenderness was present below the left breast.     Abdomen:     Normal bowel sounds, no masses, no organomegaly, soft        non-tender, non-distended, no guarding, no rebound                tenderness   Rectal:     Deferred   Extremities: No " pedal edema    Pulses:   Pulses palpable and equal bilaterally   Skin:   No bleeding, bruising or rash       Neurologic:   Cranial nerves 2 - 12 grossly intact, sensation intact, DTR       present and equal bilaterally       Telemetry  Sinus tachycardia    ECG  ECG/EMG Results (last 24 hours)     Procedure Component Value Units Date/Time    ECG 12 Lead [[id number]] Collected:  07/20/18 1104     Updated:  07/20/18 1652    Narrative:       Test Reason : soa  Blood Pressure : **/** mmHG  Vent. Rate : 135 BPM     Atrial Rate : 135 BPM     P-R Int : 124 ms          QRS Dur : 064 ms      QT Int : 298 ms       P-R-T Axes : 072 079 073 degrees     QTc Int : 447 ms    Sinus tachycardia with premature atrial complexes with aberrant conduction  Biatrial enlargement  Nonspecific ST abnormality  Abnormal ECG  No previous ECGs available  Confirmed by Rosie Lopez ([id number]) on 7/20/2018 4:52:20 PM    Referred By:  MERLE           Confirmed By:Rosie Lopez            Labs    Results from last 7 days  Lab Units 07/20/18  1115   WBC 10*3/mm3 14.39*   HEMOGLOBIN g/dL 17.7*   HEMATOCRIT % 50.6*   PLATELETS 10*3/mm3 313       Results from last 7 days  Lab Units 07/20/18  1115   SODIUM mmol/L 139   POTASSIUM mmol/L 3.6   CHLORIDE mmol/L 105   CO2 mmol/L 25.2   BUN mg/dL 9   CREATININE mg/dL 0.78   CALCIUM mg/dL 9.6   GLUCOSE mg/dL 102       Results from last 7 days  Lab Units 07/20/18  1115   BILIRUBIN mg/dL 0.7   ALK PHOS U/L 220*   AST (SGOT) U/L 138*   ALT (SGPT) U/L 164*           Results from last 7 days  Lab Units 07/20/18  1115   INR  1.03           Results from last 7 days  Lab Units 07/20/18  1312 07/20/18  1115   TROPONIN I ng/mL 0.010 <0.006             Imaging  Imaging Results (last 24 hours)     Procedure Component Value Units Date/Time    CT Chest Pulmonary Embolism With Contrast [[id number]] Collected:  07/20/18 1249     Updated:  07/20/18 1258    Narrative:       EXAMINATION: CT CHEST PULMONARY EMBOLISM W CONTRAST-       Technique: Multiple CT axial images were obtained through the level of  pulmonary arteries, following IV contrast administration per CT PE  protocol.  Volume Rendered 3D or MIP images performed.     Radiation dose reduction techniques were utilized per ALARA protocol.  Automated exposure control was initiated through either or [employer] or  [employer] software packages by  protocol.                  CLINICAL INDICATION:    SOB and Chest Pain     COMPARISON:    Chest x-ray performed on same day.     FINDINGS:     No evidence of pulmonary embolus.     Chronic interstitial fibrosis. Biapical centrilobular and paraseptal  emphysema. Peripheral fibrosis noted of the anterior lung zone regions.  No suspicious nodule or mass. No consolidation.     Heart size and bony vascularity are within normal limits. No pleural or  pericardial effusions. No thoracic adenopathy by CT size criteria.     Upper abdomen is unremarkable.     Bone windows demonstrate no acute osseous findings.       Impression:          1. No PE.   2. Pulmonary fibrosis and emphysema.  3. No acute thoracic findings identified.  4. Other incidental/nonacute findings as above.     This report was finalized on 7/20/2018 12:51 PM by Dr. Adalberto Juarez MD.       XR Chest 1 View [[id number]] Collected:  07/20/18 1247     Updated:  07/20/18 1251    Narrative:       EXAMINATION: XR CHEST 1 VW-      CLINICAL INDICATION:     soa     TECHNIQUE:  XR CHEST 1 VW-      COMPARISON: NONE      FINDINGS:        Chronic appearing interstitial lung changes.   Heart size, mediastinum, and pulmonary vascularity are unremarkable.   No pneumothorax.   No effusions.   No acute osseous findings.            Impression:       Chronic appearing interstitial lung changes. No acute  cardiopulmonary findings identified.     This report was finalized on 7/20/2018 12:49 PM by Dr. Adalberto Juarez MD.               Assessment     .  1.  Chest pain.  Atypical for angina.  Associated  with local tenderness.  Possibly musculoskeletal in origin.  No evidence of PE.  However cannot rule out cardiac etiology.    2.  Sinus tachycardia.  Possibly due to pain    .  3.  History of GERD.      Plan    1.  Echocardiogram will be done to evaluate left ventricular function and wall motion and to rule out conditions such as acute pericarditis.      2.  We will consider a Lexiscan myocardial perfusion study as source of the chest pain is still unexplained.    3.  She was instructed to stop smoking      I discussed the patients findings and my recommendations with patient      's Dr. ricci the consult      APOLINAR Lopez MD  07/20/18  5:46 PM

## 2018-07-20 NOTE — ED NOTES
EKG performed by tech at 1104 and was show to Dr. Dhillon at 1105.     Sofia Capellan  07/20/18 1112

## 2018-07-20 NOTE — ED PROVIDER NOTES
Subjective   She comes in with chest pain and shortness of breath.  This started 2 days ago.  The pain chest is in the left lower lateral chest wall.  On and off.  No obvious modifying factors.  No radiation.  Associated shortness of breath.  She does have known COPD however she is not on oxygen at home.  She continues to smoke.  She denies any nausea or vomiting or diaphoresis.             Review of Systems   Constitutional: Negative for activity change, appetite change, chills and fever.   HENT: Negative for congestion, ear pain and sore throat.    Eyes: Negative for pain and discharge.   Respiratory: Positive for shortness of breath. Negative for cough.    Cardiovascular: Positive for chest pain.   Gastrointestinal: Negative for abdominal pain, diarrhea, nausea and vomiting.   Genitourinary: Negative for difficulty urinating, flank pain and frequency.   Musculoskeletal: Negative for back pain.   Skin: Negative for rash.   Neurological: Negative for dizziness, weakness, numbness and headaches.   Psychiatric/Behavioral: Negative for behavioral problems and confusion.       Past Medical History:   Diagnosis Date   • Arthritis    • Asthma    • Fibromyalgia    • Headache    • Low back pain    • Osteoporosis        Allergies   Allergen Reactions   • Biaxin [Clarithromycin]    • Codeine    • Darvon [Propoxyphene]    • Percocet [Oxycodone-Acetaminophen]        Past Surgical History:   Procedure Laterality Date   • CHOLECYSTECTOMY  1992   • COLONOSCOPY     • ENDOMETRIAL ABLATION  1990   • ENDOSCOPY     • SHOULDER ROTATOR CUFF REPAIR  2009   • TUBAL ABDOMINAL LIGATION         Family History   Problem Relation Age of Onset   • Cancer Mother    • Cancer Father    • Lung cancer Other        Social History     Social History   • Marital status:      Social History Main Topics   • Smoking status: Current Every Day Smoker     Packs/day: 1.00   • Smokeless tobacco: Never Used   • Alcohol use No      Comment: occasionaly    • Drug use: No   • Sexual activity: Defer     Other Topics Concern   • Not on file           Objective   Physical Exam   Constitutional: She is oriented to person, place, and time. She appears well-developed and well-nourished. No distress.   HENT:   Head: Normocephalic and atraumatic.   Nose: Nose normal.   Mouth/Throat: Oropharynx is clear and moist.   Eyes: Pupils are equal, round, and reactive to light. Conjunctivae are normal.   Neck: Normal range of motion. Neck supple. No JVD present.   Cardiovascular: Normal heart sounds.    Sinus tach.  Rate 120s   Pulmonary/Chest: Effort normal and breath sounds normal. No respiratory distress. She has no wheezes. She exhibits no tenderness.   I did not hear wheezing   Abdominal: Soft. She exhibits no distension. There is no tenderness.   Musculoskeletal: Normal range of motion. She exhibits no edema or deformity.   Neurological: She is alert and oriented to person, place, and time. She has normal strength. No cranial nerve deficit or sensory deficit. Coordination normal. GCS eye subscore is 4. GCS verbal subscore is 5. GCS motor subscore is 6.   Skin: Skin is warm and dry.   Psychiatric:   Anxious otherwise normal   Nursing note and vitals reviewed.      Procedures           ED Course                  MDM  Number of Diagnoses or Management Options  Chest pain, unspecified type:      Amount and/or Complexity of Data Reviewed  Clinical lab tests: ordered and reviewed  Tests in the radiology section of CPT®: ordered and reviewed  Decide to obtain previous medical records or to obtain history from someone other than the patient: yes  Discuss the patient with other providers: yes    Patient Progress  Patient progress: stable        Final diagnoses:   Chest pain, unspecified type            Sunshine Dhillon MD  07/20/18 2298

## 2018-07-20 NOTE — H&P
Chief complaint chest pain for a few days    Subjective     Patient is a 52 y.o. female presents with chest pain for a few days.  Patient is a very poor historian.  He describes the pain on left lower chest radiating to her left shoulder, dull in nature, associated with shortness of breath which get worse with  Activity also complains that pain get worse with  Deep breathing.  Denies any nausea vomiting or dysphagia.  Patient is a chronic smoker she is over 50 years of age and has  Family  history of premature coronary artery disease.  Patient's troponin was in gray zone, ECG showed sinus tachycardia with nonspecific T-wave changes.  Patient is admitted to the hospital for further care.      History  Past Medical History:   Diagnosis Date   • Anxiety    • Arthritis    • Asthma    • Fibromyalgia    • Headache    • Osteoporosis      Past Surgical History:   Procedure Laterality Date   • CHOLECYSTECTOMY  1992   • COLONOSCOPY     • ENDOMETRIAL ABLATION  1990   • ENDOSCOPY     • SHOULDER ROTATOR CUFF REPAIR  2009   • TUBAL ABDOMINAL LIGATION       Family History   Problem Relation Age of Onset   • Cancer Mother    • Cancer Father    • Lung cancer Other      Social History   Substance Use Topics   • Smoking status: Current Every Day Smoker     Packs/day: 1.00   • Smokeless tobacco: Never Used   • Alcohol use No      Comment: occasionaly     Prescriptions Prior to Admission   Medication Sig Dispense Refill Last Dose   • omeprazole (priLOSEC) 40 MG capsule Take 40 mg by mouth Daily.   7/20/2018 at Unknown time   • naproxen (NAPROSYN) 500 MG tablet Take 500 mg by mouth 2 (Two) Times a Day As Needed for Mild Pain .   Unknown at Unknown time   • promethazine (PHENERGAN) 12.5 MG tablet Take 12.5 mg by mouth Every 8 (Eight) Hours As Needed for Nausea or Vomiting.   Unknown at Unknown time     Allergies:  Biaxin [clarithromycin]; Codeine; Darvon [propoxyphene]; and Percocet [oxycodone-acetaminophen]       Review of Systems:    Review of Systems - General ROS: negative for - , weight gain or weight loss.  Positive for fatigue and weakness  Psychological ROS: negative for -  disorientation or hallucinations.  Positive for anxiety  Ophthalmic ROS: negative for - blurry vision or double vision  ENT ROS: negative for - hearing change or vertigo  Allergy and Immunology ROS: negative for - hives, insect bite sensitivity or immunodeficiency  Hematological and Lymphatic ROS: negative for - bruising or swollen lymph nodes  Endocrine ROS: negative for - galactorrhea or hair pattern changes, no polyuria or polydipsia.  Respiratory ROS: negative for - hemoptysis or sputum changes, cough or wheezing.  Positive shortness of breath on exertion  Cardiovascular ROS: negative for - loss of consciousness,  or palpitation.  Positive for chest pain  Gastrointestinal ROS: negative for - melena or stool incontinence, nausea vomiting , diarrhea or abdominal pain  Genito-Urinary ROS: negative for - genital ulcers or pelvic pain or dysuria  Musculoskeletal ROS: negative for - gait disturbance or muscular weakness  Neurological ROS: negative for - bowel and bladder control changes or seizures, no focal weakness  Dermatological ROS: negative for hair changes and nail changes          Physical Exam:    Vital Signs  Temp:  [98 °F (36.7 °C)-98.1 °F (36.7 °C)] 98 °F (36.7 °C)  Heart Rate:  [] 90  Resp:  [20] 20  BP: (134-180)/() 149/125     General Appearance:    Alert, cooperative, in no acute distress   Head:  Normocephalic, without obvious abnormality, atraumatic   Eyes:          Lids and lashes normal, conjunctivae and sclerae normal, no icterus, no pallor, corneas clear, PERRLA   Ears:  Ears appear intact with no abnormalities noted   Throat: No oral lesions, no thrush, oral mucosa moist   Neck: No adenopathy, supple, trachea midline, no thyromegaly, no carotid bruit, no JVD   Back:   no skin lesions, no erythema or scars, no tenderness to percussion  or palpation.     Lungs:   Clear to auscultation,respirations regular, even and               Unlabored, no wheezing     Heart:  Regular rhythm and normal rate, normal S1 and S2, no        murmur, no gallop, no rub, no click   Abdomen:   Normal bowel sounds, no masses, no organomegaly, soft   non-tender, non-distended, no guarding, no rebound   tenderness   Extremities: Moves all extremities well, no edema, no cyanosis, no         redness   Pulses: Pulses palpable and equal bilaterally   Skin: No bleeding, bruising or rash   Lymph nodes: No palpable adenopathy   Neurologic: Cranial nerves 2 - 12 grossly intact, sensation intact, overall normal motor strength, DTR present and equal bilaterally             Labs:  Lab Results (last 24 hours)     Procedure Component Value Units Date/Time    Troponin [[id number]]  (Normal) Collected:  07/20/18 1312    Specimen:  Blood from Arm, Right Updated:  07/20/18 1342     Troponin I 0.010 ng/mL     Narrative:       Ultra Troponin I Reference Range:         <=0.039 ng/mL: Negative    0.04-0.779 ng/mL: Indeterminate Range. Suspicious of MI.  Clinical correlation required.       >=0.78  ng/mL: Consistent with myocardial injury.  Clinical correlation required.    Troponin [[id number]]  (Normal) Collected:  07/20/18 1115    Specimen:  Blood from Arm, Right Updated:  07/20/18 1203     Troponin I <0.006 ng/mL     Narrative:       Ultra Troponin I Reference Range:         <=0.039 ng/mL: Negative    0.04-0.779 ng/mL: Indeterminate Range. Suspicious of MI.  Clinical correlation required.       >=0.78  ng/mL: Consistent with myocardial injury.  Clinical correlation required.    BNP [[id number]]  (Normal) Collected:  07/20/18 1115    Specimen:  Blood from Arm, Right Updated:  07/20/18 1202     BNP 17.0 pg/mL     TSH [[id number]]  (Normal) Collected:  07/20/18 1115    Specimen:  Blood from Arm, Right Updated:  07/20/18 1202     TSH 0.809 mIU/mL     Protime-INR [[id number]]  (Normal) Collected:   07/20/18 1115    Specimen:  Blood from Arm, Right Updated:  07/20/18 1159     Protime 13.8 Seconds      Comment: Note new Reference Range        INR 1.03    Narrative:       Suggested INR therapeutic range for stable oral anticoagulant therapy:    Low Intensity therapy:   1.5-2.0  Moderate Intensity therapy:   2.0-3.0  High Intensity therapy:   2.5-4.0    D-dimer, Quantitative [[id number]]  (Abnormal) Collected:  07/20/18 1115    Specimen:  Blood from Arm, Right Updated:  07/20/18 1159     D-Dimer, Quantitative 0.72 (C) MCGFEU/mL      Comment: Note new Reference Range       Narrative:       d-Dimer assay result is to be used in conjunction with a non-high clinical pretest probability (PTP) assessment tool to exclude PE and aid in diagnosis of VTE with cutoff of 0.5 MCGFEU/mL.    Osmolality, Calculated [[id number]]  (Normal) Collected:  07/20/18 1115    Specimen:  Blood from Arm, Right Updated:  07/20/18 1158     Osmolality Calc 276.4 mOsm/kg     Comprehensive Metabolic Panel [[id number]]  (Abnormal) Collected:  07/20/18 1115    Specimen:  Blood from Arm, Right Updated:  07/20/18 1158     Glucose 102 mg/dL      BUN 9 mg/dL      Creatinine 0.78 mg/dL      Sodium 139 mmol/L      Potassium 3.6 mmol/L      Chloride 105 mmol/L      CO2 25.2 mmol/L      Calcium 9.6 mg/dL      Total Protein 8.6 (H) g/dL      Albumin 4.20 g/dL      ALT (SGPT) 164 (H) U/L      AST (SGOT) 138 (H) U/L      Alkaline Phosphatase 220 (H) U/L      Comment: Note New Reference Ranges        Total Bilirubin 0.7 mg/dL      eGFR Non African Amer 78 mL/min/1.73      Globulin 4.4 gm/dL      A/G Ratio 1.0 (L) g/dL      BUN/Creatinine Ratio 11.5     Anion Gap 8.8 mmol/L     Lactic Acid, Plasma [[id number]]  (Normal) Collected:  07/20/18 1115    Specimen:  Blood from Arm, Right Updated:  07/20/18 1153     Lactate 1.5 mmol/L     CBC & Differential [[id number]] Collected:  07/20/18 1115    Specimen:  Blood Updated:  07/20/18 1146    Narrative:       The following  orders were created for panel order CBC & Differential.  Procedure                               Abnormality         Status                     ---------                               -----------         ------                     CBC Auto Differential[[id number]]        Abnormal            Final result                 Please view results for these tests on the individual orders.    CBC Auto Differential [[id number]]  (Abnormal) Collected:  07/20/18 1115    Specimen:  Blood from Arm, Right Updated:  07/20/18 1146     WBC 14.39 (H) 10*3/mm3      RBC 5.52 (H) 10*6/mm3      Hemoglobin 17.7 (H) g/dL      Hematocrit 50.6 (H) %      MCV 91.7 fL      MCH 32.1 pg      MCHC 35.0 g/dL      RDW 12.6 %      RDW-SD 41.9 fl      MPV 10.0 fL      Platelets 313 10*3/mm3      Neutrophil % 69.3 %      Lymphocyte % 17.1 (L) %      Monocyte % 9.4 %      Eosinophil % 3.3 %      Basophil % 0.6 %      Immature Grans % 0.3 %      Neutrophils, Absolute 9.98 (H) 10*3/mm3      Lymphocytes, Absolute 2.46 10*3/mm3      Monocytes, Absolute 1.35 (H) 10*3/mm3      Eosinophils, Absolute 0.47 10*3/mm3      Basophils, Absolute 0.09 10*3/mm3      Immature Grans, Absolute 0.04 (H) 10*3/mm3     Blood Culture - Blood, [[id number]] Collected:  07/20/18 1124    Specimen:  Blood from Arm, Left Updated:  07/20/18 1142    Blood Culture - Blood, [[id number]] Collected:  07/20/18 1115    Specimen:  Blood from Arm, Right Updated:  07/20/18 1142          Images:  Imaging Results (last 24 hours)     Procedure Component Value Units Date/Time    CT Chest Pulmonary Embolism With Contrast [[id number]] Collected:  07/20/18 1249     Updated:  07/20/18 1258    Narrative:       EXAMINATION: CT CHEST PULMONARY EMBOLISM W CONTRAST-      Technique: Multiple CT axial images were obtained through the level of  pulmonary arteries, following IV contrast administration per CT PE  protocol.  Volume Rendered 3D or MIP images performed.     Radiation dose reduction techniques were  utilized per ALARA protocol.  Automated exposure control was initiated through either or CareDose or  DoseRight software packages by  protocol.                  CLINICAL INDICATION:    SOB and Chest Pain     COMPARISON:    Chest x-ray performed on same day.     FINDINGS:     No evidence of pulmonary embolus.     Chronic interstitial fibrosis. Biapical centrilobular and paraseptal  emphysema. Peripheral fibrosis noted of the anterior lung zone regions.  No suspicious nodule or mass. No consolidation.     Heart size and bony vascularity are within normal limits. No pleural or  pericardial effusions. No thoracic adenopathy by CT size criteria.     Upper abdomen is unremarkable.     Bone windows demonstrate no acute osseous findings.       Impression:          1. No PE.   2. Pulmonary fibrosis and emphysema.  3. No acute thoracic findings identified.  4. Other incidental/nonacute findings as above.     This report was finalized on 7/20/2018 12:51 PM by Dr. Adalberto Juarez MD.       XR Chest 1 View [[id number]] Collected:  07/20/18 1247     Updated:  07/20/18 1251    Narrative:       EXAMINATION: XR CHEST 1 VW-      CLINICAL INDICATION:     soa     TECHNIQUE:  XR CHEST 1 VW-      COMPARISON: NONE      FINDINGS:        Chronic appearing interstitial lung changes.   Heart size, mediastinum, and pulmonary vascularity are unremarkable.   No pneumothorax.   No effusions.   No acute osseous findings.            Impression:       Chronic appearing interstitial lung changes. No acute  cardiopulmonary findings identified.     This report was finalized on 7/20/2018 12:49 PM by Dr. Adalberto Juarez MD.                Assessment/Plan     Active Problems:    Chest pain        Patient is admitted on the telemetry floor  Serial cardiac markers and ECG will be done to rule out myocardial infarction.  Cardiology consult is requested for further evaluation.      Jessica Humphrey MD  07/20/18  5:04 PM

## 2018-07-21 ENCOUNTER — APPOINTMENT (OUTPATIENT)
Dept: NUCLEAR MEDICINE | Facility: HOSPITAL | Age: 53
End: 2018-07-21

## 2018-07-21 ENCOUNTER — APPOINTMENT (OUTPATIENT)
Dept: CARDIOLOGY | Facility: HOSPITAL | Age: 53
End: 2018-07-21
Attending: INTERNAL MEDICINE

## 2018-07-21 ENCOUNTER — APPOINTMENT (OUTPATIENT)
Dept: CARDIOLOGY | Facility: HOSPITAL | Age: 53
End: 2018-07-21

## 2018-07-21 VITALS
HEIGHT: 62 IN | OXYGEN SATURATION: 94 % | BODY MASS INDEX: 36.86 KG/M2 | WEIGHT: 200.3 LBS | DIASTOLIC BLOOD PRESSURE: 108 MMHG | SYSTOLIC BLOOD PRESSURE: 137 MMHG | TEMPERATURE: 97.7 F | HEART RATE: 77 BPM | RESPIRATION RATE: 20 BRPM

## 2018-07-21 LAB
BH CV ECHO MEAS - % IVS THICK: -0.73 %
BH CV ECHO MEAS - % LVPW THICK: 33.3 %
BH CV ECHO MEAS - ACS: 1.3 CM
BH CV ECHO MEAS - AO MAX PG: 5.1 MMHG
BH CV ECHO MEAS - AO MEAN PG: 3.3 MMHG
BH CV ECHO MEAS - AO ROOT AREA (BSA CORRECTED): 1.4
BH CV ECHO MEAS - AO ROOT AREA: 5.8 CM^2
BH CV ECHO MEAS - AO ROOT DIAM: 2.7 CM
BH CV ECHO MEAS - AO V2 MAX: 112.7 CM/SEC
BH CV ECHO MEAS - AO V2 MEAN: 86.2 CM/SEC
BH CV ECHO MEAS - AO V2 VTI: 28.2 CM
BH CV ECHO MEAS - BSA(HAYCOCK): 2 M^2
BH CV ECHO MEAS - BSA: 1.9 M^2
BH CV ECHO MEAS - BZI_BMI: 36.6 KILOGRAMS/M^2
BH CV ECHO MEAS - BZI_METRIC_HEIGHT: 157.5 CM
BH CV ECHO MEAS - BZI_METRIC_WEIGHT: 90.7 KG
BH CV ECHO MEAS - CONTRAST EF 4CH: 48.8 ML/M^2
BH CV ECHO MEAS - EDV(CUBED): 91.7 ML
BH CV ECHO MEAS - EDV(MOD-SP4): 43 ML
BH CV ECHO MEAS - EDV(TEICH): 92.9 ML
BH CV ECHO MEAS - EF(CUBED): 42.6 %
BH CV ECHO MEAS - EF(MOD-SP4): 48.8 %
BH CV ECHO MEAS - EF(TEICH): 35.5 %
BH CV ECHO MEAS - ESV(CUBED): 52.6 ML
BH CV ECHO MEAS - ESV(MOD-SP4): 22 ML
BH CV ECHO MEAS - ESV(TEICH): 59.9 ML
BH CV ECHO MEAS - FS: 16.9 %
BH CV ECHO MEAS - IVS/LVPW: 0.74
BH CV ECHO MEAS - IVSD: 0.81 CM
BH CV ECHO MEAS - IVSS: 0.81 CM
BH CV ECHO MEAS - LA DIMENSION: 2.9 CM
BH CV ECHO MEAS - LA/AO: 1.1
BH CV ECHO MEAS - LV DIASTOLIC VOL/BSA (35-75): 22.5 ML/M^2
BH CV ECHO MEAS - LV MASS(C)D: 144.7 GRAMS
BH CV ECHO MEAS - LV MASS(C)DI: 75.7 GRAMS/M^2
BH CV ECHO MEAS - LV MASS(C)S: 138.4 GRAMS
BH CV ECHO MEAS - LV MASS(C)SI: 72.4 GRAMS/M^2
BH CV ECHO MEAS - LV SYSTOLIC VOL/BSA (12-30): 11.5 ML/M^2
BH CV ECHO MEAS - LVIDD: 4.5 CM
BH CV ECHO MEAS - LVIDS: 3.7 CM
BH CV ECHO MEAS - LVLD AP4: 6.8 CM
BH CV ECHO MEAS - LVLS AP4: 6.4 CM
BH CV ECHO MEAS - LVOT AREA (M): 3.1 CM^2
BH CV ECHO MEAS - LVOT AREA: 3.2 CM^2
BH CV ECHO MEAS - LVOT DIAM: 2 CM
BH CV ECHO MEAS - LVPWD: 1.1 CM
BH CV ECHO MEAS - LVPWS: 1.5 CM
BH CV ECHO MEAS - MV A MAX VEL: 65.2 CM/SEC
BH CV ECHO MEAS - MV E MAX VEL: 85.4 CM/SEC
BH CV ECHO MEAS - MV E/A: 1.3
BH CV ECHO MEAS - PA ACC SLOPE: 971.5 CM/SEC^2
BH CV ECHO MEAS - PA ACC TIME: 0.09 SEC
BH CV ECHO MEAS - PA PR(ACCEL): 39.4 MMHG
BH CV ECHO MEAS - RAP SYSTOLE: 10 MMHG
BH CV ECHO MEAS - RVDD: 1.8 CM
BH CV ECHO MEAS - RVSP: 45.7 MMHG
BH CV ECHO MEAS - SI(AO): 85.1 ML/M^2
BH CV ECHO MEAS - SI(CUBED): 20.4 ML/M^2
BH CV ECHO MEAS - SI(MOD-SP4): 11 ML/M^2
BH CV ECHO MEAS - SI(TEICH): 17.2 ML/M^2
BH CV ECHO MEAS - SV(AO): 162.8 ML
BH CV ECHO MEAS - SV(CUBED): 39.1 ML
BH CV ECHO MEAS - SV(MOD-SP4): 21 ML
BH CV ECHO MEAS - SV(TEICH): 33 ML
BH CV ECHO MEAS - TR MAX VEL: 298.6 CM/SEC
BH CV STRESS BP STAGE 1: NORMAL
BH CV STRESS BP STAGE 2: NORMAL
BH CV STRESS COMMENTS STAGE 1: NORMAL
BH CV STRESS COMMENTS STAGE 2: NORMAL
BH CV STRESS DOSE REGADENOSON STAGE 1: 0.4
BH CV STRESS DURATION MIN STAGE 1: 0
BH CV STRESS DURATION MIN STAGE 2: 4
BH CV STRESS DURATION SEC STAGE 1: 10
BH CV STRESS DURATION SEC STAGE 2: 0
BH CV STRESS HR STAGE 1: 99
BH CV STRESS HR STAGE 2: 78
BH CV STRESS PROTOCOL 1: NORMAL
BH CV STRESS RECOVERY BP: NORMAL MMHG
BH CV STRESS RECOVERY HR: 78 BPM
BH CV STRESS STAGE 1: 1
BH CV STRESS STAGE 2: 2
CK MB SERPL-CCNC: 3.4 NG/ML (ref 0–5)
CK MB SERPL-RTO: 2.2 % (ref 0–3)
CK SERPL-CCNC: 156 U/L (ref 24–173)
MAXIMAL PREDICTED HEART RATE: 169 BPM
PERCENT MAX PREDICTED HR: 58.58 %
STRESS BASELINE BP: NORMAL MMHG
STRESS BASELINE HR: 69 BPM
STRESS PERCENT HR: 69 %
STRESS POST PEAK BP: NORMAL MMHG
STRESS POST PEAK HR: 99 BPM
STRESS TARGET HR: 144 BPM
TROPONIN I SERPL-MCNC: 0.01 NG/ML
TROPONIN I SERPL-MCNC: <0.006 NG/ML

## 2018-07-21 PROCEDURE — 93005 ELECTROCARDIOGRAM TRACING: CPT | Performed by: INTERNAL MEDICINE

## 2018-07-21 PROCEDURE — 93017 CV STRESS TEST TRACING ONLY: CPT

## 2018-07-21 PROCEDURE — 78452 HT MUSCLE IMAGE SPECT MULT: CPT

## 2018-07-21 PROCEDURE — 84484 ASSAY OF TROPONIN QUANT: CPT | Performed by: INTERNAL MEDICINE

## 2018-07-21 PROCEDURE — 25010000002 KETOROLAC TROMETHAMINE PER 15 MG: Performed by: INTERNAL MEDICINE

## 2018-07-21 PROCEDURE — 0 TECHNETIUM SESTAMIBI: Performed by: INTERNAL MEDICINE

## 2018-07-21 PROCEDURE — A9500 TC99M SESTAMIBI: HCPCS | Performed by: INTERNAL MEDICINE

## 2018-07-21 PROCEDURE — 82550 ASSAY OF CK (CPK): CPT | Performed by: INTERNAL MEDICINE

## 2018-07-21 PROCEDURE — 82553 CREATINE MB FRACTION: CPT | Performed by: INTERNAL MEDICINE

## 2018-07-21 PROCEDURE — 93010 ELECTROCARDIOGRAM REPORT: CPT | Performed by: INTERNAL MEDICINE

## 2018-07-21 PROCEDURE — 25010000002 REGADENOSON 0.4 MG/5ML SOLUTION: Performed by: INTERNAL MEDICINE

## 2018-07-21 PROCEDURE — 93306 TTE W/DOPPLER COMPLETE: CPT

## 2018-07-21 RX ADMIN — TECHNETIUM TC 99M SESTAMIBI 1 DOSE: 1 INJECTION INTRAVENOUS at 07:10

## 2018-07-21 RX ADMIN — TRAMADOL HYDROCHLORIDE 50 MG: 50 TABLET, COATED ORAL at 09:51

## 2018-07-21 RX ADMIN — KETOROLAC TROMETHAMINE 30 MG: 30 INJECTION, SOLUTION INTRAMUSCULAR; INTRAVENOUS at 02:56

## 2018-07-21 RX ADMIN — REGADENOSON 0.4 MG: 0.08 INJECTION, SOLUTION INTRAVENOUS at 09:47

## 2018-07-21 RX ADMIN — TECHNETIUM TC 99M SESTAMIBI 1 DOSE: 1 INJECTION INTRAVENOUS at 08:47

## 2018-07-21 NOTE — PROGRESS NOTES
LOS: 0 days   Patient Care Team:  Jessica Humphrey MD as PCP - General  Jessica Humphrey MD as PCP - Family Medicine  Jessica Humphrey MD as PCP - Claims Attributed  Zohra Breaux RN as Care Coordinator (Population Health)    Brief history of present illness:   Patient is a 52 y.o. female presents with chest pain for a few days.  Patient is a very poor historian.  He describes the pain on left lower chest radiating to her left shoulder, dull in nature, associated with shortness of breath which get worse with  Activity also complains that pain get worse with  Deep breathing.  Denies any nausea vomiting or dysphagia.  Patient is a chronic smoker she is over 50 years of age and has  Family  history of premature coronary artery disease.  Patient's troponin was in gray zone, ECG showed sinus tachycardia with nonspecific T-wave changes.  Patient is admitted to the hospital for further care.         Subjective     Patient is feeling fair.  He frequently goes out to smoke, patient has been advised quitting smoking.  History taken from: patient and chart    Interval History:     No significant changes to patients past history, family history, and social history.     Review of Systems:   Review of Systems - General ROS: negative for - , weight gain or weight loss.  Positive for fatigue and weakness  Psychological ROS: negative for -  disorientation or hallucinations.  Positive for anxiety  Ophthalmic ROS: negative for - blurry vision or double vision  ENT ROS: negative for - hearing change or vertigo  Allergy and Immunology ROS: negative for - hives, insect bite sensitivity or immunodeficiency  Hematological and Lymphatic ROS: negative for - bruising or swollen lymph nodes  Endocrine ROS: negative for - galactorrhea or hair pattern changes, no polyuria or polydipsia.  Respiratory ROS: negative for - hemoptysis or sputum changes, cough or wheezing.  Positive shortness of breath on exertion  Cardiovascular ROS: negative for  - loss of consciousness,  or palpitation.  Positive for chest pain  Gastrointestinal ROS: negative for - melena or stool incontinence, nausea vomiting , diarrhea or abdominal pain  Genito-Urinary ROS: negative for - genital ulcers or pelvic pain or dysuria  Musculoskeletal ROS: negative for - gait disturbance or muscular weakness  Neurological ROS: negative for - bowel and bladder control changes or seizures, no focal weakness  Dermatological ROS: negative for hair changes and nail changes        Physical Exam:    Vital Signs  Temp:  [97.6 °F (36.4 °C)-98.1 °F (36.7 °C)] 97.6 °F (36.4 °C)  Heart Rate:  [] 73  Resp:  [20] 20  BP: (133-180)/() 142/100   General Appearance:     Alert, cooperative, in no acute distress   Head:  Normocephalic, without obvious abnormality, atraumatic   Eyes:          Lids and lashes normal, conjunctivae and sclerae normal, no icterus, no pallor, corneas clear, PERRLA   Ears:  Ears appear intact with no abnormalities noted   Throat: No oral lesions, no thrush, oral mucosa moist   Neck: No adenopathy, supple, trachea midline, no thyromegaly, no carotid bruit, no JVD   Back:   no skin lesions, no erythema or scars, no tenderness to percussion or palpation.     Lungs:   Clear to auscultation,respirations regular, even and               Unlabored, no wheezing     Heart:  Regular rhythm and normal rate, normal S1 and S2, no        murmur, no gallop, no rub, no click   Abdomen:   Normal bowel sounds, no masses, no organomegaly, soft   non-tender, non-distended, no guarding, no rebound   tenderness   Extremities: Moves all extremities well, no edema, no cyanosis, no         redness   Pulses: Pulses palpable and equal bilaterally   Skin: No bleeding, bruising or rash   Lymph nodes: No palpable adenopathy   Neurologic: Cranial nerves 2 - 12 grossly intact, sensation intact, overall normal motor strength, DTR present and equal bilaterally           Labs:    Results from last 7 days  Lab  Units 07/20/18  1115   LACTATE mmol/L 1.5   WBC 10*3/mm3 14.39*   HEMOGLOBIN g/dL 17.7*   HEMATOCRIT % 50.6*   MCV fL 91.7   MCHC g/dL 35.0   PLATELETS 10*3/mm3 313   INR  1.03           Results from last 7 days  Lab Units 07/20/18  1115   SODIUM mmol/L 139   POTASSIUM mmol/L 3.6   CHLORIDE mmol/L 105   CO2 mmol/L 25.2   BUN mg/dL 9   CREATININE mg/dL 0.78   EGFR IF NONAFRICN AM mL/min/1.73 78   CALCIUM mg/dL 9.6   GLUCOSE mg/dL 102   ALBUMIN g/dL 4.20   BILIRUBIN mg/dL 0.7   ALK PHOS U/L 220*   AST (SGOT) U/L 138*   ALT (SGPT) U/L 164*   Estimated Creatinine Clearance: 89.4 mL/min (by C-G formula based on SCr of 0.78 mg/dL).  No results found for: AMMONIA  Lab Results   Component Value Date    CKTOTAL 156 07/21/2018    CKMB 3.40 07/21/2018    CKMBINDEX 2.2 07/21/2018    TROPONINI 0.008 07/21/2018         No results found for: HGBA1C  Lab Results   Component Value Date    TSH 0.809 07/20/2018     No results found for: PREGTESTUR, PREGSERUM, HCG, HCGQUANT    Pain Management Panel     Pain Management Panel Latest Ref Rng & Units 6/24/2018    AMPHETAMINES SCREEN, URINE Negative Negative    BARBITURATES SCREEN Negative Negative    BENZODIAZEPINE SCREEN, URINE Negative Negative    BUPRENORPHINE Negative Negative    COCAINE SCREEN, URINE Negative Negative    METHADONE SCREEN, URINE Negative Negative                Cultures:  @LASTLAB3(BLOODCX:*,URINECX:*,WOUNDCX:*,MRSACX:*,RESPCX:*,STOOLCX:*)@    Lab Results   Component Value Date    CKTOTAL 156 07/21/2018    CKMB 3.40 07/21/2018    CKMBINDEX 2.2 07/21/2018    TROPONINI 0.008 07/21/2018    TROPONINI 0.010 07/20/2018    TROPONINI <0.006 07/20/2018             Images:  Imaging Results (last 24 hours)     Procedure Component Value Units Date/Time    CT Chest Pulmonary Embolism With Contrast [[id number]] Collected:  07/20/18 1249     Updated:  07/20/18 1258    Narrative:       EXAMINATION: CT CHEST PULMONARY EMBOLISM W CONTRAST-      Technique: Multiple CT axial images were  obtained through the level of  pulmonary arteries, following IV contrast administration per CT PE  protocol.  Volume Rendered 3D or MIP images performed.     Radiation dose reduction techniques were utilized per ALARA protocol.  Automated exposure control was initiated through either or i[employer] or  Root[employer] software packages by  protocol.                  CLINICAL INDICATION:    SOB and Chest Pain     COMPARISON:    Chest x-ray performed on same day.     FINDINGS:     No evidence of pulmonary embolus.     Chronic interstitial fibrosis. Biapical centrilobular and paraseptal  emphysema. Peripheral fibrosis noted of the anterior lung zone regions.  No suspicious nodule or mass. No consolidation.     Heart size and bony vascularity are within normal limits. No pleural or  pericardial effusions. No thoracic adenopathy by CT size criteria.     Upper abdomen is unremarkable.     Bone windows demonstrate no acute osseous findings.       Impression:          1. No PE.   2. Pulmonary fibrosis and emphysema.  3. No acute thoracic findings identified.  4. Other incidental/nonacute findings as above.     This report was finalized on 7/20/2018 12:51 PM by Dr. Adalberto Juarez MD.       XR Chest 1 View [[id number]] Collected:  07/20/18 1247     Updated:  07/20/18 1251    Narrative:       EXAMINATION: XR CHEST 1 VW-      CLINICAL INDICATION:     soa     TECHNIQUE:  XR CHEST 1 VW-      COMPARISON: NONE      FINDINGS:        Chronic appearing interstitial lung changes.   Heart size, mediastinum, and pulmonary vascularity are unremarkable.   No pneumothorax.   No effusions.   No acute osseous findings.            Impression:       Chronic appearing interstitial lung changes. No acute  cardiopulmonary findings identified.     This report was finalized on 7/20/2018 12:49 PM by Dr. Adalberto Juarez MD.              Active Meds      Current Facility-Administered Medications:   •  cyclobenzaprine (FLEXERIL) tablet 10 mg, 10 mg,  Oral, Q8H, Jessica Humphrey MD  •  ketorolac (TORADOL) injection 30 mg, 30 mg, Intravenous, Q6H PRN, Jessica Humphrey MD, 30 mg at 07/21/18 0256  •  pantoprazole (PROTONIX) EC tablet 40 mg, 40 mg, Oral, Q AM, Jessica Humphrey MD  •  traMADol (ULTRAM) tablet 50 mg, 50 mg, Oral, Q6H PRN, Jessica Humphrey MD, 50 mg at 07/20/18 2022    Assessment/Plan     Active Problems:    Chest pain        Cardiology consult appreciated.  Patient is undergoing echo and Lexiscan stress test for further evaluation of chest pain.  Her cardiac markers are negative for myocardial injury.  Continue current management.        Jessica Humphrey MD  07/21/18  8:00 AM

## 2018-07-21 NOTE — NURSING NOTE
Pt. Has chosen to sign out AMA. Paperwork was brought and pt was educated on the risks of leaving AMA.

## 2018-07-21 NOTE — PLAN OF CARE
Problem: Patient Care Overview  Goal: Plan of Care Review  Outcome: Ongoing (interventions implemented as appropriate)      Problem: Pain, Acute (Adult)  Goal: Identify Related Risk Factors and Signs and Symptoms  Outcome: Ongoing (interventions implemented as appropriate)      Problem: Cardiac: ACS (Acute Coronary Syndrome) (Adult)  Goal: Signs and Symptoms of Listed Potential Problems Will be Absent, Minimized or Managed (Cardiac: ACS)  Outcome: Ongoing (interventions implemented as appropriate)

## 2018-07-21 NOTE — PLAN OF CARE
Problem: Patient Care Overview  Goal: Plan of Care Review  Outcome: Ongoing (interventions implemented as appropriate)    Goal: Individualization and Mutuality  Outcome: Ongoing (interventions implemented as appropriate)    Goal: Discharge Needs Assessment  Outcome: Ongoing (interventions implemented as appropriate)    Goal: Interprofessional Rounds/Family Conf  Outcome: Ongoing (interventions implemented as appropriate)      Problem: Pain, Acute (Adult)  Goal: Identify Related Risk Factors and Signs and Symptoms  Outcome: Ongoing (interventions implemented as appropriate)    Goal: Acceptable Pain Control/Comfort Level  Outcome: Ongoing (interventions implemented as appropriate)      Problem: Cardiac: ACS (Acute Coronary Syndrome) (Adult)  Goal: Signs and Symptoms of Listed Potential Problems Will be Absent, Minimized or Managed (Cardiac: ACS)  Outcome: Ongoing (interventions implemented as appropriate)      Problem: Chronic Obstructive Pulmonary Disease (Adult)  Goal: Signs and Symptoms of Listed Potential Problems Will be Absent, Minimized or Managed (Chronic Obstructive Pulmonary Disease)  Outcome: Ongoing (interventions implemented as appropriate)      Problem: Infection, Risk/Actual (Adult)  Goal: Identify Related Risk Factors and Signs and Symptoms  Outcome: Ongoing (interventions implemented as appropriate)    Goal: Infection Prevention/Resolution  Outcome: Ongoing (interventions implemented as appropriate)

## 2018-07-21 NOTE — NURSING NOTE
Pt. Daughter Екатерина came and discussed pt care. She asked if a drug screen had been done and expresesd concern of pt drug use as pt has used for several years. She was relieved to know ECHO and Stress were negative.

## 2018-07-21 NOTE — PROGRESS NOTES
"Reason for follow-up:  Chest pain    Subjective:    Patient is still having chest pain in the left side of the chest and in the back of lesser intensity.  Cardiac markers ×3-negative for MI.  Scheduled for stress test and echocardiogram today.      Medication Review:     cyclobenzaprine 10 mg Oral Q8H   pantoprazole 40 mg Oral Q AM         Vital Sign Min/Max for last 24 hours  Temp  Min: 97.6 °F (36.4 °C)  Max: 98.1 °F (36.7 °C)   BP  Min: 133/86  Max: 180/91   Pulse  Min: 73  Max: 128   Resp  Min: 20  Max: 20   SpO2  Min: 95 %  Max: 99 %   No Data Recorded   Weight  Min: 81.6 kg (180 lb)  Max: 91 kg (200 lb 11.2 oz)     Flowsheet Rows      First Filed Value   Admission Height  157.5 cm (62\") Documented at 07/20/2018 1107   Admission Weight  81.6 kg (180 lb) Documented at 07/20/2018 1107          Physical Exam:     General Appearance:    Alert, cooperative, in no acute distress   Head:    Normocephalic, without obvious abnormality, atraumatic   Eyes:            Lids and lashes normal, conjunctivae and sclerae normal, no   icterus, no pallor, corneas clear, PERRLA   Ears:    Ears appear intact with no abnormalities noted   Throat:   No oral lesions, no thrush, oral mucosa moist   Neck:   No adenopathy, supple, trachea midline, no thyromegaly, no   carotid bruit, no JVD   Back:     No kyphosis present, no scoliosis present, no skin lesions,      erythema or scars, no tenderness to percussion or                   palpation,   range of motion normal   Lungs:     Clear to auscultation,respirations regular, even and                  unlabored    Heart:  Tachycardia.  Regular rhythm.  No murmurs.     Chest Wall:    No abnormalities observed area tenderness was present below the left breast.     Abdomen:     Normal bowel sounds, no masses, no organomegaly, soft        non-tender, non-distended, no guarding, no rebound                tenderness   Rectal:     Deferred   Extremities: No pedal edema           Telemetry  Normal " sinus rhythm          Labs    Results from last 7 days  Lab Units 07/20/18  1115   WBC 10*3/mm3 14.39*   HEMOGLOBIN g/dL 17.7*   HEMATOCRIT % 50.6*   PLATELETS 10*3/mm3 313       Results from last 7 days  Lab Units 07/20/18  1115   SODIUM mmol/L 139   POTASSIUM mmol/L 3.6   CHLORIDE mmol/L 105   CO2 mmol/L 25.2   BUN mg/dL 9   CREATININE mg/dL 0.78   CALCIUM mg/dL 9.6   GLUCOSE mg/dL 102       Results from last 7 days  Lab Units 07/20/18  1115   BILIRUBIN mg/dL 0.7   ALK PHOS U/L 220*   AST (SGOT) U/L 138*   ALT (SGPT) U/L 164*           Results from last 7 days  Lab Units 07/20/18  1115   INR  1.03           Results from last 7 days  Lab Units 07/21/18  0628 07/20/18  1312 07/20/18  1115   CK TOTAL U/L 156  --   --    TROPONIN I ng/mL 0.008 0.010 <0.006   CK MB INDEX % 2.2  --   --              Assessment    1.  Chest pain.  Atypical for angina.  No MI.  2.  Sinus tachycardia-resolved      Plan    Scheduled for a nuclear stress test today for ischemia evaluation and an echocardiogram to evaluate LV function/wall motion and to rule out conditions such as acute pericarditis.    .    Rosie Lopez MD  07/21/18  8:58 AM

## 2018-07-25 ENCOUNTER — APPOINTMENT (OUTPATIENT)
Dept: GENERAL RADIOLOGY | Facility: HOSPITAL | Age: 53
End: 2018-07-25

## 2018-07-25 ENCOUNTER — HOSPITAL ENCOUNTER (EMERGENCY)
Facility: HOSPITAL | Age: 53
Discharge: HOME | End: 2018-07-25
Attending: INTERNAL MEDICINE | Admitting: EMERGENCY MEDICINE

## 2018-07-25 VITALS
DIASTOLIC BLOOD PRESSURE: 99 MMHG | BODY MASS INDEX: 33.13 KG/M2 | TEMPERATURE: 98.1 F | WEIGHT: 180 LBS | HEIGHT: 62 IN | SYSTOLIC BLOOD PRESSURE: 141 MMHG | HEART RATE: 71 BPM | OXYGEN SATURATION: 97 % | RESPIRATION RATE: 20 BRPM

## 2018-07-25 DIAGNOSIS — R07.9 CHEST PAIN, UNSPECIFIED TYPE: Primary | ICD-10-CM

## 2018-07-25 LAB
ALBUMIN SERPL-MCNC: 3.9 G/DL (ref 3.5–5)
ALBUMIN/GLOB SERPL: 1 G/DL (ref 1.5–2.5)
ALP SERPL-CCNC: 218 U/L (ref 35–104)
ALT SERPL W P-5'-P-CCNC: 195 U/L (ref 10–36)
AMYLASE SERPL-CCNC: 43 U/L (ref 28–100)
ANION GAP SERPL CALCULATED.3IONS-SCNC: 6 MMOL/L (ref 3.6–11.2)
AST SERPL-CCNC: 206 U/L (ref 10–30)
BACTERIA SPEC AEROBE CULT: NORMAL
BACTERIA SPEC AEROBE CULT: NORMAL
BASOPHILS # BLD AUTO: 0.08 10*3/MM3 (ref 0–0.3)
BASOPHILS NFR BLD AUTO: 0.9 % (ref 0–2)
BILIRUB SERPL-MCNC: 0.5 MG/DL (ref 0.2–1.8)
BUN BLD-MCNC: 8 MG/DL (ref 7–21)
BUN/CREAT SERPL: 13.3 (ref 7–25)
CALCIUM SPEC-SCNC: 9.3 MG/DL (ref 7.7–10)
CHLORIDE SERPL-SCNC: 106 MMOL/L (ref 99–112)
CO2 SERPL-SCNC: 29 MMOL/L (ref 24.3–31.9)
CREAT BLD-MCNC: 0.6 MG/DL (ref 0.43–1.29)
DEPRECATED RDW RBC AUTO: 41.9 FL (ref 37–54)
EOSINOPHIL # BLD AUTO: 0.38 10*3/MM3 (ref 0–0.7)
EOSINOPHIL NFR BLD AUTO: 4.2 % (ref 0–5)
ERYTHROCYTE [DISTWIDTH] IN BLOOD BY AUTOMATED COUNT: 12.7 % (ref 11.5–14.5)
GFR SERPL CREATININE-BSD FRML MDRD: 105 ML/MIN/1.73
GLOBULIN UR ELPH-MCNC: 4.1 GM/DL
GLUCOSE BLD-MCNC: 104 MG/DL (ref 70–110)
HCT VFR BLD AUTO: 47.9 % (ref 37–47)
HGB BLD-MCNC: 16.7 G/DL (ref 12–16)
IMM GRANULOCYTES # BLD: 0.02 10*3/MM3 (ref 0–0.03)
IMM GRANULOCYTES NFR BLD: 0.2 % (ref 0–0.5)
INR PPP: 1.07 (ref 0.9–1.1)
LIPASE SERPL-CCNC: 34 U/L (ref 13–60)
LYMPHOCYTES # BLD AUTO: 2.43 10*3/MM3 (ref 1–3)
LYMPHOCYTES NFR BLD AUTO: 27.1 % (ref 21–51)
MAGNESIUM SERPL-MCNC: 1.9 MG/DL (ref 1.7–2.6)
MCH RBC QN AUTO: 32.2 PG (ref 27–33)
MCHC RBC AUTO-ENTMCNC: 34.9 G/DL (ref 33–37)
MCV RBC AUTO: 92.3 FL (ref 80–94)
MONOCYTES # BLD AUTO: 1 10*3/MM3 (ref 0.1–0.9)
MONOCYTES NFR BLD AUTO: 11.1 % (ref 0–10)
NEUTROPHILS # BLD AUTO: 5.07 10*3/MM3 (ref 1.4–6.5)
NEUTROPHILS NFR BLD AUTO: 56.5 % (ref 30–70)
OSMOLALITY SERPL CALC.SUM OF ELEC: 279.9 MOSM/KG (ref 273–305)
PLATELET # BLD AUTO: 285 10*3/MM3 (ref 130–400)
PMV BLD AUTO: 9.8 FL (ref 6–10)
POTASSIUM BLD-SCNC: 3.4 MMOL/L (ref 3.5–5.3)
PROT SERPL-MCNC: 8 G/DL (ref 6–8)
PROTHROMBIN TIME: 14.1 SECONDS (ref 11–15.4)
RBC # BLD AUTO: 5.19 10*6/MM3 (ref 4.2–5.4)
SODIUM BLD-SCNC: 141 MMOL/L (ref 135–153)
TROPONIN I SERPL-MCNC: <0.006 NG/ML
WBC NRBC COR # BLD: 8.98 10*3/MM3 (ref 4.5–12.5)

## 2018-07-25 PROCEDURE — 96375 TX/PRO/DX INJ NEW DRUG ADDON: CPT

## 2018-07-25 PROCEDURE — 36415 COLL VENOUS BLD VENIPUNCTURE: CPT

## 2018-07-25 PROCEDURE — 85025 COMPLETE CBC W/AUTO DIFF WBC: CPT | Performed by: INTERNAL MEDICINE

## 2018-07-25 PROCEDURE — 85610 PROTHROMBIN TIME: CPT | Performed by: INTERNAL MEDICINE

## 2018-07-25 PROCEDURE — 71045 X-RAY EXAM CHEST 1 VIEW: CPT | Performed by: RADIOLOGY

## 2018-07-25 PROCEDURE — 93010 ELECTROCARDIOGRAM REPORT: CPT | Performed by: INTERNAL MEDICINE

## 2018-07-25 PROCEDURE — 93005 ELECTROCARDIOGRAM TRACING: CPT | Performed by: INTERNAL MEDICINE

## 2018-07-25 PROCEDURE — 80053 COMPREHEN METABOLIC PANEL: CPT | Performed by: INTERNAL MEDICINE

## 2018-07-25 PROCEDURE — 71045 X-RAY EXAM CHEST 1 VIEW: CPT

## 2018-07-25 PROCEDURE — 25010000002 ORPHENADRINE CITRATE PER 60 MG: Performed by: INTERNAL MEDICINE

## 2018-07-25 PROCEDURE — 82150 ASSAY OF AMYLASE: CPT | Performed by: INTERNAL MEDICINE

## 2018-07-25 PROCEDURE — 83690 ASSAY OF LIPASE: CPT | Performed by: INTERNAL MEDICINE

## 2018-07-25 PROCEDURE — 83735 ASSAY OF MAGNESIUM: CPT | Performed by: INTERNAL MEDICINE

## 2018-07-25 PROCEDURE — 99284 EMERGENCY DEPT VISIT MOD MDM: CPT

## 2018-07-25 PROCEDURE — 25010000002 KETOROLAC TROMETHAMINE PER 15 MG: Performed by: INTERNAL MEDICINE

## 2018-07-25 PROCEDURE — 84484 ASSAY OF TROPONIN QUANT: CPT | Performed by: INTERNAL MEDICINE

## 2018-07-25 PROCEDURE — 96374 THER/PROPH/DIAG INJ IV PUSH: CPT

## 2018-07-25 RX ORDER — KETOROLAC TROMETHAMINE 30 MG/ML
15 INJECTION, SOLUTION INTRAMUSCULAR; INTRAVENOUS ONCE
Status: COMPLETED | OUTPATIENT
Start: 2018-07-25 | End: 2018-07-25

## 2018-07-25 RX ORDER — TIZANIDINE HYDROCHLORIDE 4 MG/1
4 CAPSULE, GELATIN COATED ORAL 3 TIMES DAILY PRN
Qty: 15 CAPSULE | Refills: 0 | Status: SHIPPED | OUTPATIENT
Start: 2018-07-25

## 2018-07-25 RX ORDER — DIFLUNISAL 500 MG/1
500 TABLET, FILM COATED ORAL 2 TIMES DAILY
Qty: 20 TABLET | Refills: 0 | Status: SHIPPED | OUTPATIENT
Start: 2018-07-25

## 2018-07-25 RX ORDER — ORPHENADRINE CITRATE 30 MG/ML
60 INJECTION INTRAMUSCULAR; INTRAVENOUS ONCE
Status: COMPLETED | OUTPATIENT
Start: 2018-07-25 | End: 2018-07-25

## 2018-07-25 RX ADMIN — ORPHENADRINE CITRATE 60 MG: 30 INJECTION INTRAMUSCULAR; INTRAVENOUS at 19:20

## 2018-07-25 RX ADMIN — KETOROLAC TROMETHAMINE 15 MG: 30 INJECTION, SOLUTION INTRAMUSCULAR at 19:22

## 2018-07-26 ENCOUNTER — EPISODE CHANGES (OUTPATIENT)
Dept: CASE MANAGEMENT | Facility: OTHER | Age: 53
End: 2018-07-26

## 2018-07-28 NOTE — DISCHARGE SUMMARY
Date of Admission: 7/20/2018 11:10 AM    Date of Discharge:  7/21/2018      Presenting Problem/History of Present Illness  Chest pain, unspecified type [R07.9]  Chest pain, unspecified type [R07.9]     Patient is a 52 y.o. female presents with chest pain for a few days.  Patient is a very poor historian.  He describes the pain on left lower chest radiating to her left shoulder, dull in nature, associated with shortness of breath which get worse with  Activity also complains that pain get worse with  Deep breathing.  Denies any nausea vomiting or dysphagia.  Patient is a chronic smoker she is over 50 years of age and has  Family  history of premature coronary artery disease.  Patient's troponin was in gray zone, ECG showed sinus tachycardia with nonspecific T-wave changes.  Patient is admitted to the hospital for further care.     Hospital Course  Patient is a 52 y.o. female presented with chest pain.  Patient was monitored on the telemetry floor I Dr. Lopez in cardiology R date markers were negative for myocardial injury and she underwent echocardiography showed normal LV ejection fraction without any significant valvular heart disease, she does have evidence of pulmonary hypertension.  Agnes scan a stress test was a low risk study without any evidence of ischemia and normal LV ejection fraction.  On the evening of 07/21/2018 patient decided to go home by signing AGAINST MEDICAL ADVICE.     Procedures Performed         Consults:   Consults     No orders found from 6/21/2018 to 7/21/2018.          Pertinent Test Results:     Lab Results (last 24 hours)     Procedure Component Value Units Date/Time    Troponin [[id number]]  (Normal) Collected:  07/21/18 1007    Specimen:  Blood Updated:  07/21/18 1054     Troponin I <0.006 ng/mL     Narrative:       Ultra Troponin I Reference Range:         <=0.039 ng/mL: Negative    0.04-0.779 ng/mL: Indeterminate Range. Suspicious of MI.  Clinical correlation required.       >=0.78   ng/mL: Consistent with myocardial injury.  Clinical correlation required.    Troponin [[id number]]  (Normal) Collected:  07/21/18 0628    Specimen:  Blood Updated:  07/21/18 0716     Troponin I 0.008 ng/mL     Narrative:       Ultra Troponin I Reference Range:         <=0.039 ng/mL: Negative    0.04-0.779 ng/mL: Indeterminate Range. Suspicious of MI.  Clinical correlation required.       >=0.78  ng/mL: Consistent with myocardial injury.  Clinical correlation required.    CK-MB [[id number]]  (Normal) Collected:  07/21/18 0628    Specimen:  Blood Updated:  07/21/18 0716     CKMB 3.40 ng/mL     CK-MB Index [[id number]]  (Normal) Collected:  07/21/18 0628    Specimen:  Blood Updated:  07/21/18 0716     CK-MB Index 2.2 %     CK [[id number]]  (Normal) Collected:  07/21/18 0628    Specimen:  Blood Updated:  07/21/18 0711     Creatine Kinase 156 U/L           Imaging Results (last 7 days)     Procedure Component Value Units Date/Time    CT Chest Pulmonary Embolism With Contrast [[id number]] Collected:  07/20/18 1249     Updated:  07/20/18 1258    Narrative:       EXAMINATION: CT CHEST PULMONARY EMBOLISM W CONTRAST-      Technique: Multiple CT axial images were obtained through the level of  pulmonary arteries, following IV contrast administration per CT PE  protocol.  Volume Rendered 3D or MIP images performed.     Radiation dose reduction techniques were utilized per ALARA protocol.  Automated exposure control was initiated through either or [place] or  D[employer]Right software packages by  protocol.                  CLINICAL INDICATION:    SOB and Chest Pain     COMPARISON:    Chest x-ray performed on same day.     FINDINGS:     No evidence of pulmonary embolus.     Chronic interstitial fibrosis. Biapical centrilobular and paraseptal  emphysema. Peripheral fibrosis noted of the anterior lung zone regions.  No suspicious nodule or mass. No consolidation.     Heart size and bony vascularity are within normal limits. No  pleural or  pericardial effusions. No thoracic adenopathy by CT size criteria.     Upper abdomen is unremarkable.     Bone windows demonstrate no acute osseous findings.       Impression:          1. No PE.   2. Pulmonary fibrosis and emphysema.  3. No acute thoracic findings identified.  4. Other incidental/nonacute findings as above.     This report was finalized on 7/20/2018 12:51 PM by Dr. Adalberto Juarez MD.       XR Chest 1 View [[id number]] Collected:  07/20/18 1247     Updated:  07/20/18 1251    Narrative:       EXAMINATION: XR CHEST 1 VW-      CLINICAL INDICATION:     soa     TECHNIQUE:  XR CHEST 1 VW-      COMPARISON: NONE      FINDINGS:        Chronic appearing interstitial lung changes.   Heart size, mediastinum, and pulmonary vascularity are unremarkable.   No pneumothorax.   No effusions.   No acute osseous findings.            Impression:       Chronic appearing interstitial lung changes. No acute  cardiopulmonary findings identified.     This report was finalized on 7/20/2018 12:49 PM by Dr. Adalberto Juarez MD.             ECG/EMG Results (last 72 hours)     Procedure Component Value Units Date/Time    ECG 12 Lead [[id number]] Collected:  07/20/18 1104     Updated:  07/20/18 1652    Narrative:       Test Reason : soa  Blood Pressure : **/** mmHG  Vent. Rate : 135 BPM     Atrial Rate : 135 BPM     P-R Int : 124 ms          QRS Dur : 064 ms      QT Int : 298 ms       P-R-T Axes : 072 079 073 degrees     QTc Int : 447 ms    Sinus tachycardia with premature atrial complexes with aberrant conduction  Biatrial enlargement  Nonspecific ST abnormality  Abnormal ECG  No previous ECGs available  Confirmed by Rosie Lopez ([id number]) on 7/20/2018 4:52:20 PM    Referred By:  MERLE           Confirmed By:Rosie Lopez    Adult Transthoracic Echo Complete W/ Cont if Necessary Per Protocol [[id number]] Collected:  07/21/18 0716     Updated:  07/21/18 1027     BSA 1.9 m^2      BH CV ECHO NAILA - RVDD 1.8 cm      IVSd  0.81 cm      IVSs 0.81 cm      LVIDd 4.5 cm      LVIDs 3.7 cm      LVPWd 1.1 cm      BH CV ECHO NAILA - LVPWS 1.5 cm      IVS/LVPW 0.74     FS 16.9 %      EDV(Teich) 92.9 ml      ESV(Teich) 59.9 ml      EF(Teich) 35.5 %      EDV(cubed) 91.7 ml      ESV(cubed) 52.6 ml      EF(cubed) 42.6 %      % IVS thick -0.73 %      % LVPW thick 33.3 %      LV mass(C)d 144.7 grams      LV mass(C)dI 75.7 grams/m^2      LV mass(C)s 138.4 grams      LV mass(C)sI 72.4 grams/m^2      SV(Teich) 33.0 ml      SI(Teich) 17.2 ml/m^2      SV(cubed) 39.1 ml      SI(cubed) 20.4 ml/m^2      Ao root diam 2.7 cm      Ao root area 5.8 cm^2      ACS 1.3 cm      LA dimension 2.9 cm      LA/Ao 1.1     LVOT diam 2.0 cm      LVOT area 3.2 cm^2      LVOT area(traced) 3.1 cm^2      LVLd ap4 6.8 cm      EDV(MOD-sp4) 43.0 ml      LVLs ap4 6.4 cm      ESV(MOD-sp4) 22.0 ml      EF(MOD-sp4) 48.8 %      SV(MOD-sp4) 21.0 ml      SI(MOD-sp4) 11.0 ml/m^2      Ao root area (BSA corrected) 1.4     CONTRAST EF 4CH 48.8 ml/m^2      LV Diastolic corrected for BSA 22.5 ml/m^2      LV Systolic corrected for BSA 11.5 ml/m^2      MV E max chinmay 85.4 cm/sec      MV A max chinmay 65.2 cm/sec      MV E/A 1.3     Ao pk chinmay 112.7 cm/sec      Ao max PG 5.1 mmHg      Ao V2 mean 86.2 cm/sec      Ao mean PG 3.3 mmHg      Ao V2 VTI 28.2 cm      SV(Ao) 162.8 ml      SI(Ao) 85.1 ml/m^2      PA acc slope 971.5 cm/sec^2      PA acc time 0.09 sec      TR max chinmay 298.6 cm/sec      RVSP(TR) 45.7 mmHg      RAP systole 10.0 mmHg      PA pr(Accel) 39.4 mmHg       CV ECHO NAILA - BZI_BMI 36.6 kilograms/m^2       CV ECHO NAILA - BSA(HAYCOCK) 2.0 m^2       CV ECHO NAILA - BZI_METRIC_WEIGHT 90.7 kg       CV ECHO NAILA - BZI_METRIC_HEIGHT 157.5 cm     Narrative:       · Left ventricular systolic function is normal. Estimated EF appears to be   in the range of 66 - 70%. Normal left ventricular cavity size and wall   thickness noted. All left ventricular wall segments contract normally.  · Normal  cardiac chamber dimensions.  · Mild tricuspid valve regurgitation is present.  · Mild pulmonary hypertension is present.  · The aortic, mitral and pulmonic valves appear normal in structure and   showed no regurgitations.  · There is no evidence of pericardial effusion.       ECG 12 Lead [[id number]] Collected:  07/21/18 1059     Updated:  07/21/18 1338    Narrative:       Test Reason : Chest Pain  Blood Pressure : **/** mmHG  Vent. Rate : 068 BPM     Atrial Rate : 068 BPM     P-R Int : 140 ms          QRS Dur : 086 ms      QT Int : 436 ms       P-R-T Axes : 051 072 025 degrees     QTc Int : 463 ms    Normal sinus rhythm  Normal ECG    Confirmed by Rosie Lopez ([id number]) on 7/21/2018 1:37:48 PM    Referred By:  ALBERTO           Confirmed By:Rosei Lopez            Discharge Diagnosis:   Chest pain    Discharge Medications     Discharge Medications      ASK your doctor about these medications      Instructions Start Date   naproxen 500 MG tablet  Commonly known as:  NAPROSYN   500 mg, Oral, 2 Times Daily PRN      omeprazole 40 MG capsule  Commonly known as:  priLOSEC   40 mg, Oral, Daily      promethazine 12.5 MG tablet  Commonly known as:  PHENERGAN   12.5 mg, Oral, Every 8 Hours PRN             Discharge Diet:     Activity at Discharge:     Follow-up Appointments  No future appointments.      Test Results Pending at Discharge       Jessica Humphrey MD  07/28/18  10:17 AM

## 2018-08-08 ENCOUNTER — OFFICE VISIT (OUTPATIENT)
Dept: RETAIL CLINIC | Facility: CLINIC | Age: 53
End: 2018-08-08

## 2018-08-08 VITALS
WEIGHT: 182.2 LBS | BODY MASS INDEX: 33.53 KG/M2 | OXYGEN SATURATION: 99 % | HEART RATE: 88 BPM | HEIGHT: 62 IN | SYSTOLIC BLOOD PRESSURE: 138 MMHG | RESPIRATION RATE: 20 BRPM | DIASTOLIC BLOOD PRESSURE: 84 MMHG | TEMPERATURE: 98.2 F

## 2018-08-08 DIAGNOSIS — T14.8XXA PUNCTURE WOUND: Primary | ICD-10-CM

## 2018-08-08 PROCEDURE — 90715 TDAP VACCINE 7 YRS/> IM: CPT | Performed by: NURSE PRACTITIONER

## 2018-08-08 PROCEDURE — 99213 OFFICE O/P EST LOW 20 MIN: CPT | Performed by: NURSE PRACTITIONER

## 2018-08-08 PROCEDURE — 90471 IMMUNIZATION ADMIN: CPT | Performed by: NURSE PRACTITIONER

## 2018-08-08 RX ORDER — AMOXICILLIN AND CLAVULANATE POTASSIUM 875; 125 MG/1; MG/1
1 TABLET, FILM COATED ORAL EVERY 12 HOURS SCHEDULED
Qty: 20 TABLET | Refills: 0 | Status: SHIPPED | OUTPATIENT
Start: 2018-08-08 | End: 2018-08-18

## 2018-08-08 RX ORDER — FLUCONAZOLE 150 MG/1
TABLET ORAL
Qty: 2 TABLET | Refills: 0 | OUTPATIENT
Start: 2018-08-08 | End: 2019-12-01 | Stop reason: HOSPADM

## 2018-08-08 NOTE — PATIENT INSTRUCTIONS
Puncture Wound  A puncture wound is an injury that is caused by a sharp, thin object that goes through your skin, such as a nail. A puncture wound usually does not leave a large opening in your skin, so it may not bleed a lot. However, when you get a puncture wound, dirt or other materials (foreign bodies) can be forced into your wound and break off inside. This makes it more likely that an infection will happen, such as tetanus.  Follow these instructions at home:  Medicines  · Take or apply over-the-counter and prescription medicines only as told by your doctor.  · If you were prescribed an antibiotic medicine, take or apply it as told by your doctor. Do not stop using the antibiotic even if your condition starts to get better.  Wound care  · There are many ways to close and cover a wound. For example, a wound can be covered with stitches (sutures), skin glue, or adhesive strips. Follow instructions from your doctor about:  ? How to take care of your wound.  ? When and how you should change your bandage (dressing).  ? When you should remove your bandage.  ? Removing whatever was used to close your wound.  · Keep the bandage dry as told by your doctor. Do not take baths, swim, use a hot tub, or do anything that would put your wound underwater until your doctor says it is okay.  · Clean the wound as told by your doctor.  · Do not scratch or pick at the wound.  · Check your wound every day for signs of infection. Watch for:  ? Redness, swelling, or pain.  ? Fluid, blood, or pus.  General instructions  · Raise (elevate) the injured area above the level of your heart while you are sitting or lying down.  · If your puncture wound is in your foot, ask your doctor if you need to avoid putting weight on your foot and for how long.  · Keep all follow-up visits as told by your doctor. This is important.  Contact a doctor if:  · You got a tetanus shot and you have any of these problems at the injection  site:  ? Swelling.  ? Very bad pain.  ? Redness.  ? Bleeding.  · You have a fever.  · Your stitches come out.  · You notice a bad smell coming from your wound or your bandage.  · You notice something coming out of the wound, such as wood or glass.  · Medicine does not help your pain.  · You have more redness, swelling, or pain at the site of your wound.  · You have fluid, blood, or pus coming from your wound.  · You notice a change in the color of your skin near your wound.  · You need to change the bandage often because fluid, blood, or pus is coming from the wound.  · You start to have a new rash.  · You start to have numbness around the wound.  Get help right away if:  · You have very bad swelling around the wound.  · Your pain suddenly gets worse and is very bad.  · You start to get painful skin lumps.  · You have a red streak going away from your wound.  · The wound is on your hand or foot and you cannot move a finger or toe like you usually can.  · The wound is on your hand or foot and you notice that your fingers or toes look pale or bluish.  This information is not intended to replace advice given to you by your health care provider. Make sure you discuss any questions you have with your health care provider.  Document Released: 09/26/2009 Document Revised: 05/25/2017 Document Reviewed: 02/10/2016  [employer] Interactive Patient Education © 2018 [employer]

## 2018-08-08 NOTE — PROGRESS NOTES
Subjective   Arelis Riggs is a 52 y.o. female.   Chief Complaint   Patient presents with   • Puncture Wound      53 yo w/f presents with complaint of sticking david nail into left calf previous evening, leg is red and swollen, unsure of last tetnus vaccine.  Cleaned with soap and water.  Refer to HPI/ROS for additional information.      Puncture Wound   This is a new problem. The current episode started yesterday. The problem has been unchanged. The symptoms are aggravated by walking. Treatments tried: clensed with soap and water. The treatment provided no relief.        The following portions of the patient's history were reviewed and updated as appropriate: allergies, current medications, past family history, past medical history, past social history, past surgical history and problem list.    Current Outpatient Prescriptions:   •  naproxen (NAPROSYN) 500 MG tablet, Take 500 mg by mouth 2 (Two) Times a Day As Needed for Mild Pain ., Disp: , Rfl:   •  omeprazole (priLOSEC) 40 MG capsule, Take 40 mg by mouth Daily., Disp: , Rfl:   •  amoxicillin-clavulanate (AUGMENTIN) 875-125 MG per tablet, Take 1 tablet by mouth Every 12 (Twelve) Hours for 10 days., Disp: 20 tablet, Rfl: 0  •  diflunisal (DOLOBID) 500 MG tablet tablet, Take 1 tablet by mouth 2 (Two) Times a Day., Disp: 20 tablet, Rfl: 0  •  fluconazole (DIFLUCAN) 150 MG tablet, Take 1 tablet as needed if s/s of yeast infection, Disp: 2 tablet, Rfl: 0  •  promethazine (PHENERGAN) 12.5 MG tablet, Take 12.5 mg by mouth Every 8 (Eight) Hours As Needed for Nausea or Vomiting., Disp: , Rfl:   •  TiZANidine (ZANAFLEX) 4 MG capsule, Take 1 capsule by mouth 3 (Three) Times a Day As Needed for Muscle Spasms., Disp: 15 capsule, Rfl: 0    Review of Systems   Constitutional: Negative.    HENT: Negative.    Eyes: Negative.    Respiratory: Negative.    Cardiovascular: Negative.    Gastrointestinal: Negative.    Skin: Positive for wound.   Psychiatric/Behavioral: Negative.   "    /84   Pulse 88   Temp 98.2 °F (36.8 °C) (Temporal Artery )   Resp 20   Ht 157.5 cm (62\")   Wt 82.6 kg (182 lb 3.2 oz)   SpO2 99%   BMI 33.32 kg/m²     Objective   Allergies   Allergen Reactions   • Biaxin [Clarithromycin]    • Codeine    • Darvon [Propoxyphene]    • Percocet [Oxycodone-Acetaminophen]    • Tramadol Nausea And Vomiting       Physical Exam   Constitutional: She is oriented to person, place, and time. She appears well-developed and well-nourished. No distress.   Neck: Normal range of motion.   Cardiovascular: Normal rate, regular rhythm and normal heart sounds.    Pulmonary/Chest: Effort normal and breath sounds normal. No respiratory distress. She has no wheezes. She has no rales. She exhibits no tenderness.   Neurological: She is alert and oriented to person, place, and time.   Skin: Skin is warm. Capillary refill takes less than 2 seconds. She is not diaphoretic. There is erythema.        Psychiatric: She has a normal mood and affect. Her behavior is normal.   Vitals reviewed.      Assessment/Plan   Arelis was seen today for puncture wound.    Diagnoses and all orders for this visit:    Puncture wound    Other orders  -     amoxicillin-clavulanate (AUGMENTIN) 875-125 MG per tablet; Take 1 tablet by mouth Every 12 (Twelve) Hours for 10 days.  -     Tdap Vaccine Greater Than or Equal To 8yo IM  -     fluconazole (DIFLUCAN) 150 MG tablet; Take 1 tablet as needed if s/s of yeast infection         An After Visit Summary was printed, reviewed, and given to the patient. Understanding verbalized and agrees with treatment plan.  If no improvement or becomes worse, follow up with primary or go to [facility]/ER.            August 8, 2018 4:31 PM   "

## 2018-12-20 ENCOUNTER — APPOINTMENT (OUTPATIENT)
Dept: GENERAL RADIOLOGY | Facility: HOSPITAL | Age: 53
End: 2018-12-20

## 2018-12-20 ENCOUNTER — HOSPITAL ENCOUNTER (EMERGENCY)
Facility: HOSPITAL | Age: 53
Discharge: LEFT BEFORE DISCHARGE | End: 2018-12-20
Attending: EMERGENCY MEDICINE | Admitting: EMERGENCY MEDICINE

## 2018-12-20 VITALS
WEIGHT: 190 LBS | RESPIRATION RATE: 20 BRPM | HEART RATE: 100 BPM | TEMPERATURE: 98 F | OXYGEN SATURATION: 92 % | BODY MASS INDEX: 33.66 KG/M2 | DIASTOLIC BLOOD PRESSURE: 93 MMHG | HEIGHT: 63 IN | SYSTOLIC BLOOD PRESSURE: 126 MMHG

## 2018-12-20 DIAGNOSIS — R07.9 CHEST PAIN, UNSPECIFIED TYPE: Primary | ICD-10-CM

## 2018-12-20 LAB
ALBUMIN SERPL-MCNC: 4.1 G/DL (ref 3.5–5)
ALBUMIN/GLOB SERPL: 1.1 G/DL (ref 1.5–2.5)
ALP SERPL-CCNC: 177 U/L (ref 35–104)
ALT SERPL W P-5'-P-CCNC: 84 U/L (ref 10–36)
ANION GAP SERPL CALCULATED.3IONS-SCNC: 9.5 MMOL/L (ref 3.6–11.2)
AST SERPL-CCNC: 88 U/L (ref 10–30)
BACTERIA UR QL AUTO: ABNORMAL /HPF
BASOPHILS # BLD AUTO: 0.04 10*3/MM3 (ref 0–0.3)
BASOPHILS NFR BLD AUTO: 0.3 % (ref 0–2)
BILIRUB SERPL-MCNC: 1 MG/DL (ref 0.2–1.8)
BILIRUB UR QL STRIP: NEGATIVE
BNP SERPL-MCNC: 10 PG/ML (ref 0–100)
BUN BLD-MCNC: 6 MG/DL (ref 7–21)
BUN/CREAT SERPL: 9.4 (ref 7–25)
CALCIUM SPEC-SCNC: 9.3 MG/DL (ref 7.7–10)
CHLORIDE SERPL-SCNC: 105 MMOL/L (ref 99–112)
CLARITY UR: CLEAR
CO2 SERPL-SCNC: 23.5 MMOL/L (ref 24.3–31.9)
COLOR UR: ABNORMAL
CREAT BLD-MCNC: 0.64 MG/DL (ref 0.43–1.29)
D DIMER PPP FEU-MCNC: 0.36 MCGFEU/ML (ref 0–0.5)
DEPRECATED RDW RBC AUTO: 41.3 FL (ref 37–54)
EOSINOPHIL # BLD AUTO: 0.24 10*3/MM3 (ref 0–0.7)
EOSINOPHIL NFR BLD AUTO: 2.1 % (ref 0–5)
ERYTHROCYTE [DISTWIDTH] IN BLOOD BY AUTOMATED COUNT: 12.3 % (ref 11.5–14.5)
GFR SERPL CREATININE-BSD FRML MDRD: 97 ML/MIN/1.73
GLOBULIN UR ELPH-MCNC: 3.8 GM/DL
GLUCOSE BLD-MCNC: 105 MG/DL (ref 70–110)
GLUCOSE UR STRIP-MCNC: NEGATIVE MG/DL
HCT VFR BLD AUTO: 49.1 % (ref 37–47)
HGB BLD-MCNC: 17.1 G/DL (ref 12–16)
HGB UR QL STRIP.AUTO: NEGATIVE
HOLD SPECIMEN: NORMAL
HOLD SPECIMEN: NORMAL
HYALINE CASTS UR QL AUTO: ABNORMAL /LPF
IMM GRANULOCYTES # BLD AUTO: 0.02 10*3/MM3 (ref 0–0.03)
IMM GRANULOCYTES NFR BLD AUTO: 0.2 % (ref 0–0.5)
KETONES UR QL STRIP: NEGATIVE
LEUKOCYTE ESTERASE UR QL STRIP.AUTO: ABNORMAL
LYMPHOCYTES # BLD AUTO: 3.04 10*3/MM3 (ref 1–3)
LYMPHOCYTES NFR BLD AUTO: 26.2 % (ref 21–51)
MCH RBC QN AUTO: 32.1 PG (ref 27–33)
MCHC RBC AUTO-ENTMCNC: 34.8 G/DL (ref 33–37)
MCV RBC AUTO: 92.3 FL (ref 80–94)
MONOCYTES # BLD AUTO: 1.12 10*3/MM3 (ref 0.1–0.9)
MONOCYTES NFR BLD AUTO: 9.6 % (ref 0–10)
NEUTROPHILS # BLD AUTO: 7.16 10*3/MM3 (ref 1.4–6.5)
NEUTROPHILS NFR BLD AUTO: 61.6 % (ref 30–70)
NITRITE UR QL STRIP: NEGATIVE
OSMOLALITY SERPL CALC.SUM OF ELEC: 273.7 MOSM/KG (ref 273–305)
PH UR STRIP.AUTO: 6 [PH] (ref 5–8)
PLATELET # BLD AUTO: 271 10*3/MM3 (ref 130–400)
PMV BLD AUTO: 9.6 FL (ref 6–10)
POTASSIUM BLD-SCNC: 3 MMOL/L (ref 3.5–5.3)
PROT SERPL-MCNC: 7.9 G/DL (ref 6–8)
PROT UR QL STRIP: NEGATIVE
RBC # BLD AUTO: 5.32 10*6/MM3 (ref 4.2–5.4)
RBC # UR: ABNORMAL /HPF
REF LAB TEST METHOD: ABNORMAL
SODIUM BLD-SCNC: 138 MMOL/L (ref 135–153)
SP GR UR STRIP: 1.01 (ref 1–1.03)
SQUAMOUS #/AREA URNS HPF: ABNORMAL /HPF
TROPONIN I SERPL-MCNC: 0.01 NG/ML
UROBILINOGEN UR QL STRIP: ABNORMAL
WBC NRBC COR # BLD: 11.62 10*3/MM3 (ref 4.5–12.5)
WBC UR QL AUTO: ABNORMAL /HPF
WHOLE BLOOD HOLD SPECIMEN: NORMAL
WHOLE BLOOD HOLD SPECIMEN: NORMAL

## 2018-12-20 PROCEDURE — 93010 ELECTROCARDIOGRAM REPORT: CPT | Performed by: INTERNAL MEDICINE

## 2018-12-20 PROCEDURE — 83880 ASSAY OF NATRIURETIC PEPTIDE: CPT | Performed by: PHYSICIAN ASSISTANT

## 2018-12-20 PROCEDURE — 85379 FIBRIN DEGRADATION QUANT: CPT | Performed by: PHYSICIAN ASSISTANT

## 2018-12-20 PROCEDURE — 84484 ASSAY OF TROPONIN QUANT: CPT | Performed by: EMERGENCY MEDICINE

## 2018-12-20 PROCEDURE — 99285 EMERGENCY DEPT VISIT HI MDM: CPT

## 2018-12-20 PROCEDURE — 71045 X-RAY EXAM CHEST 1 VIEW: CPT | Performed by: RADIOLOGY

## 2018-12-20 PROCEDURE — 85025 COMPLETE CBC W/AUTO DIFF WBC: CPT | Performed by: FAMILY MEDICINE

## 2018-12-20 PROCEDURE — 71045 X-RAY EXAM CHEST 1 VIEW: CPT

## 2018-12-20 PROCEDURE — 36415 COLL VENOUS BLD VENIPUNCTURE: CPT

## 2018-12-20 PROCEDURE — 93005 ELECTROCARDIOGRAM TRACING: CPT | Performed by: FAMILY MEDICINE

## 2018-12-20 PROCEDURE — 81001 URINALYSIS AUTO W/SCOPE: CPT | Performed by: PHYSICIAN ASSISTANT

## 2018-12-20 PROCEDURE — 80053 COMPREHEN METABOLIC PANEL: CPT | Performed by: EMERGENCY MEDICINE

## 2018-12-20 RX ORDER — ASPIRIN 325 MG
325 TABLET ORAL ONCE
Status: DISCONTINUED | OUTPATIENT
Start: 2018-12-20 | End: 2018-12-20

## 2018-12-20 RX ORDER — ASPIRIN 81 MG/1
324 TABLET, CHEWABLE ORAL ONCE
Status: COMPLETED | OUTPATIENT
Start: 2018-12-20 | End: 2018-12-20

## 2018-12-20 RX ORDER — ASPIRIN 81 MG/1
TABLET, CHEWABLE ORAL
Status: COMPLETED
Start: 2018-12-20 | End: 2018-12-20

## 2018-12-20 RX ORDER — SODIUM CHLORIDE 0.9 % (FLUSH) 0.9 %
10 SYRINGE (ML) INJECTION AS NEEDED
Status: DISCONTINUED | OUTPATIENT
Start: 2018-12-20 | End: 2018-12-21 | Stop reason: HOSPADM

## 2018-12-20 RX ADMIN — ASPIRIN 324 MG: 81 TABLET, CHEWABLE ORAL at 20:41

## 2018-12-21 NOTE — ED NOTES
Patient stated that she wanted to sign out AMA again at this time. She stated to me that I was the only one that was nice to her here. I asked pt again if she was sure she wanted to leave and she stated yes. Pt verbalized understanding of risks and consequences of leaving, that did include death.      Cortez Keating, RN  12/20/18 3296

## 2018-12-21 NOTE — ED NOTES
"At bedside with provider. Patient stated to us, \"I have been here four hours. And I feel like I have not been treated properly.\" Provider told pt that we had to wait on blood work to come back before we could figure out what is going on. Patient asked how much longer it would take. Proivder stated that it could take up to an hour before we could get the results from the blood that the pt would not let us draw. Pt stated that she would just go home and go somewhere else because she has been here for \"5 hours\".        Cortez Keating, RN  12/20/18 4476    "

## 2018-12-21 NOTE — ED NOTES
At  5625 I went to draw troponin and pt refused and wanted to sign out AMA advised nurse at that time     Arleen Verdin  12/20/18 0159

## 2018-12-21 NOTE — ED PROVIDER NOTES
Subjective   54 y/o female presents to the ED with complaints of chest pain, palpitations, and SOB.  Patient states that this has been going on for several weeks. Stated that her PCP diagnosed her with bronchitis but states she is not getting any better. Patient states that she has a PMH - COPD. No prior cardiac hx.         History provided by:  Patient   used: No        Review of Systems   Constitutional: Negative.    HENT: Negative.    Eyes: Negative.    Respiratory: Positive for shortness of breath.    Cardiovascular: Positive for chest pain and palpitations.   Gastrointestinal: Negative.    Endocrine: Negative.    Genitourinary: Negative.    Musculoskeletal: Negative.    Skin: Negative.    Allergic/Immunologic: Negative.    Neurological: Negative.    Hematological: Negative.    Psychiatric/Behavioral: Negative.    All other systems reviewed and are negative.      Past Medical History:   Diagnosis Date   • Anxiety    • Arthritis    • Asthma    • Fibromyalgia    • Headache    • Osteoporosis        Allergies   Allergen Reactions   • Biaxin [Clarithromycin]    • Codeine    • Darvon [Propoxyphene]    • Percocet [Oxycodone-Acetaminophen]    • Tramadol Nausea And Vomiting       Past Surgical History:   Procedure Laterality Date   • CHOLECYSTECTOMY  1992   • COLONOSCOPY     • ENDOMETRIAL ABLATION  1990   • ENDOSCOPY     • SHOULDER ROTATOR CUFF REPAIR  2009   • TUBAL ABDOMINAL LIGATION         Family History   Problem Relation Age of Onset   • Cancer Mother    • Cancer Father    • Lung cancer Other    • Coronary artery disease Sister        Social History     Socioeconomic History   • Marital status:      Spouse name: Not on file   • Number of children: Not on file   • Years of education: Not on file   • Highest education level: Not on file   Tobacco Use   • Smoking status: Current Every Day Smoker     Packs/day: 1.00   • Smokeless tobacco: Never Used   • Tobacco comment: encouraged cessation,  "declined   Substance and Sexual Activity   • Alcohol use: No     Comment: occasionaly   • Drug use: No   • Sexual activity: Defer           Objective   Physical Exam   Constitutional: She is oriented to person, place, and time. She appears well-developed and well-nourished.   HENT:   Head: Normocephalic and atraumatic.   Eyes: EOM are normal. Pupils are equal, round, and reactive to light.   Neck: Normal range of motion. Neck supple.   Cardiovascular: Normal rate, regular rhythm, intact distal pulses and normal pulses.   Pulmonary/Chest: Effort normal and breath sounds normal.   Abdominal: Soft. Bowel sounds are normal.   Musculoskeletal: Normal range of motion.        Right lower leg: Normal.        Left lower leg: Normal.   Neurological: She is alert and oriented to person, place, and time.   Skin: Skin is warm and dry.   Nursing note and vitals reviewed.      Procedures           ED Course  ED Course as of Dec 20 2251   Thu Dec 20, 2018   2054 1924- reviewed by Dr. Tomlin, rate 120, Sinus tachy, no ischemia  ECG 12 Lead [ML]   2207 Patient refused to let tech draw second set of cardiac enzymes.  Pt was explained to the at her first set was normal but needed a second set to rule out cardiac problems that could be causing her sx.    [ML]   2246 Patient demanded to leave AMA to nurse Poole.  I went to speak to the pt regarding her request. Patient tells me that she has been here for \"4 hours\" and that she did not feel we were addressing her sx properly. I explained to the patient that she told us she was having chest pain, SOB, and palpitations and that she stated she thought something was wrong with her heart.  I explained that that warranted a cardiac work up and that we needed to draw a second set of cardiac enzymes.  She then asked how long that could take and I explained that this test can take up to 1 hour.  She stated I will just go home. She verbalized an understanding of the risks with leaving AMA.  Patient " is alert and oriented x4 and coherent to make her own medical decisions. Refer to nursing documentation.   [ML]      ED Course User Index  [ML] Ana Jones PA                  [facility]      Final diagnoses:   Chest pain, unspecified type            Ana Jones PA  12/20/18 7608

## 2018-12-21 NOTE — ED NOTES
"Patient stated at this time that she wants to sign out AMA. She stated, \" I have been here four hours and have been hurting the whole time and no one gives a damn. I am going to another hospital\". I spoke with patient and asked her if she would speak with provider. She stated, \"Who is Ana? I don't know who that is?\". I told her that it was her provider that is seeing her that came into her room when she first got here. She agreed to speak with her first.             Cortez Keating, RN  12/20/18 3041    "

## 2019-05-16 ENCOUNTER — TRANSCRIBE ORDERS (OUTPATIENT)
Dept: ADMINISTRATIVE | Facility: HOSPITAL | Age: 54
End: 2019-05-16

## 2019-05-16 DIAGNOSIS — R10.31 ABDOMINAL PAIN, RIGHT LOWER QUADRANT: Primary | ICD-10-CM

## 2019-05-30 ENCOUNTER — HOSPITAL ENCOUNTER (OUTPATIENT)
Dept: CT IMAGING | Facility: HOSPITAL | Age: 54
Discharge: HOME | End: 2019-05-30
Admitting: NURSE PRACTITIONER

## 2019-05-30 DIAGNOSIS — R10.31 ABDOMINAL PAIN, RIGHT LOWER QUADRANT: ICD-10-CM

## 2019-05-30 PROCEDURE — 74160 CT ABDOMEN W/CONTRAST: CPT | Performed by: RADIOLOGY

## 2019-05-30 PROCEDURE — 0 IOVERSOL 68 % SOLUTION: Performed by: NURSE PRACTITIONER

## 2019-05-30 PROCEDURE — 74160 CT ABDOMEN W/CONTRAST: CPT

## 2019-05-30 RX ADMIN — IOVERSOL 100 ML: 678 INJECTION INTRA-ARTERIAL; INTRAVENOUS at 08:25

## 2019-08-12 ENCOUNTER — HOSPITAL ENCOUNTER (OUTPATIENT)
Dept: RESPIRATORY THERAPY | Facility: HOSPITAL | Age: 54
Discharge: HOME | End: 2019-08-12
Admitting: INTERNAL MEDICINE

## 2019-08-12 ENCOUNTER — TRANSCRIBE ORDERS (OUTPATIENT)
Dept: ADMINISTRATIVE | Facility: HOSPITAL | Age: 54
End: 2019-08-12

## 2019-08-12 DIAGNOSIS — J44.9 CHRONIC OBSTRUCTIVE PULMONARY DISEASE, UNSPECIFIED COPD TYPE (HCC): Primary | ICD-10-CM

## 2019-08-12 LAB
A-A DO2: 22.9 MMHG (ref 0–300)
ARTERIAL PATENCY WRIST A: POSITIVE
ATMOSPHERIC PRESS: 727 MMHG
BASE EXCESS BLDA CALC-SCNC: 0.7 MMOL/L
BDY SITE: ABNORMAL
BODY TEMPERATURE: 98.6 C
COHGB MFR BLD: 3.1 % (ref 0–5)
HCO3 BLDA-SCNC: 24 MMOL/L (ref 22–26)
HCT VFR BLD CALC: 51 % (ref 37–47)
HGB BLDA-MCNC: 17.5 G/DL (ref 12–16)
HOROWITZ INDEX BLD+IHG-RTO: 21 %
METHGB BLD QL: 0.2 % (ref 0–3)
MODALITY: ABNORMAL
NOTE: ABNORMAL
OXYHGB MFR BLDV: 92.4 % (ref 85–100)
PCO2 BLDA: 34.9 MM HG (ref 35–45)
PCO2 TEMP ADJ BLD: ABNORMAL MM HG (ref 35–45)
PH BLDA: 7.46 PH UNITS (ref 7.35–7.45)
PH, TEMP CORRECTED: ABNORMAL PH UNITS (ref 7.35–7.45)
PO2 BLDA: 78.1 MM HG (ref 80–100)
PO2 TEMP ADJ BLD: ABNORMAL MM HG (ref 83–108)
SAO2 % BLDCOA: 95.6 % (ref 90–100)

## 2019-08-12 PROCEDURE — 82375 ASSAY CARBOXYHB QUANT: CPT | Performed by: INTERNAL MEDICINE

## 2019-08-12 PROCEDURE — 83050 HGB METHEMOGLOBIN QUAN: CPT | Performed by: INTERNAL MEDICINE

## 2019-08-12 PROCEDURE — 36600 WITHDRAWAL OF ARTERIAL BLOOD: CPT | Performed by: INTERNAL MEDICINE

## 2019-08-12 PROCEDURE — 82805 BLOOD GASES W/O2 SATURATION: CPT | Performed by: INTERNAL MEDICINE

## 2019-11-27 ENCOUNTER — TRANSCRIBE ORDERS (OUTPATIENT)
Dept: ADMINISTRATIVE | Facility: HOSPITAL | Age: 54
End: 2019-11-27

## 2019-11-27 DIAGNOSIS — R10.32 ABDOMINAL PAIN, LEFT LOWER QUADRANT: Primary | ICD-10-CM

## 2019-12-01 ENCOUNTER — APPOINTMENT (OUTPATIENT)
Dept: ULTRASOUND IMAGING | Facility: HOSPITAL | Age: 54
End: 2019-12-01

## 2019-12-01 ENCOUNTER — HOSPITAL ENCOUNTER (EMERGENCY)
Facility: HOSPITAL | Age: 54
Discharge: HOME | End: 2019-12-01
Attending: FAMILY MEDICINE | Admitting: FAMILY MEDICINE

## 2019-12-01 ENCOUNTER — APPOINTMENT (OUTPATIENT)
Dept: GENERAL RADIOLOGY | Facility: HOSPITAL | Age: 54
End: 2019-12-01

## 2019-12-01 ENCOUNTER — APPOINTMENT (OUTPATIENT)
Dept: CT IMAGING | Facility: HOSPITAL | Age: 54
End: 2019-12-01

## 2019-12-01 VITALS
HEART RATE: 70 BPM | TEMPERATURE: 97.9 F | DIASTOLIC BLOOD PRESSURE: 84 MMHG | SYSTOLIC BLOOD PRESSURE: 116 MMHG | RESPIRATION RATE: 20 BRPM | BODY MASS INDEX: 31.28 KG/M2 | WEIGHT: 170 LBS | HEIGHT: 62 IN | OXYGEN SATURATION: 100 %

## 2019-12-01 DIAGNOSIS — R07.89 ATYPICAL CHEST PAIN: Primary | ICD-10-CM

## 2019-12-01 DIAGNOSIS — R10.30 LOWER ABDOMINAL PAIN: ICD-10-CM

## 2019-12-01 DIAGNOSIS — N30.01 ACUTE CYSTITIS WITH HEMATURIA: ICD-10-CM

## 2019-12-01 LAB
6-ACETYL MORPHINE: NEGATIVE
ALBUMIN SERPL-MCNC: 3.38 G/DL (ref 3.5–5.2)
ALBUMIN/GLOB SERPL: 0.7 G/DL
ALP SERPL-CCNC: 194 U/L (ref 39–117)
ALT SERPL W P-5'-P-CCNC: 83 U/L (ref 1–33)
AMPHET+METHAMPHET UR QL: POSITIVE
ANION GAP SERPL CALCULATED.3IONS-SCNC: 12.1 MMOL/L (ref 5–15)
AST SERPL-CCNC: 169 U/L (ref 1–32)
BACTERIA UR QL AUTO: ABNORMAL /HPF
BARBITURATES UR QL SCN: NEGATIVE
BASOPHILS # BLD AUTO: 0.06 10*3/MM3 (ref 0–0.2)
BASOPHILS NFR BLD AUTO: 0.9 % (ref 0–1.5)
BENZODIAZ UR QL SCN: NEGATIVE
BILIRUB SERPL-MCNC: 1.1 MG/DL (ref 0.2–1.2)
BILIRUB UR QL STRIP: NEGATIVE
BUN BLD-MCNC: 12 MG/DL (ref 6–20)
BUN/CREAT SERPL: 18.5 (ref 7–25)
BUPRENORPHINE SERPL-MCNC: NEGATIVE NG/ML
CALCIUM SPEC-SCNC: 9.3 MG/DL (ref 8.6–10.5)
CANNABINOIDS SERPL QL: NEGATIVE
CHLORIDE SERPL-SCNC: 100 MMOL/L (ref 98–107)
CLARITY UR: CLEAR
CO2 SERPL-SCNC: 25.9 MMOL/L (ref 22–29)
COCAINE UR QL: NEGATIVE
COLOR UR: YELLOW
CREAT BLD-MCNC: 0.65 MG/DL (ref 0.57–1)
D DIMER PPP FEU-MCNC: 0.6 MCGFEU/ML (ref 0–0.5)
D-LACTATE SERPL-SCNC: 1.3 MMOL/L (ref 0.5–2)
DEPRECATED RDW RBC AUTO: 47.2 FL (ref 37–54)
EOSINOPHIL # BLD AUTO: 0.33 10*3/MM3 (ref 0–0.4)
EOSINOPHIL NFR BLD AUTO: 4.8 % (ref 0.3–6.2)
ERYTHROCYTE [DISTWIDTH] IN BLOOD BY AUTOMATED COUNT: 13.5 % (ref 12.3–15.4)
GFR SERPL CREATININE-BSD FRML MDRD: 95 ML/MIN/1.73
GLOBULIN UR ELPH-MCNC: 4.9 GM/DL
GLUCOSE BLD-MCNC: 87 MG/DL (ref 65–99)
GLUCOSE UR STRIP-MCNC: NEGATIVE MG/DL
HCT VFR BLD AUTO: 47.1 % (ref 34–46.6)
HGB BLD-MCNC: 15.7 G/DL (ref 12–15.9)
HGB UR QL STRIP.AUTO: NEGATIVE
HOLD SPECIMEN: NORMAL
HOLD SPECIMEN: NORMAL
HYALINE CASTS UR QL AUTO: ABNORMAL /LPF
IMM GRANULOCYTES # BLD AUTO: 0.01 10*3/MM3 (ref 0–0.05)
IMM GRANULOCYTES NFR BLD AUTO: 0.1 % (ref 0–0.5)
INR PPP: 0.96 (ref 0.9–1.1)
KETONES UR QL STRIP: NEGATIVE
LEUKOCYTE ESTERASE UR QL STRIP.AUTO: ABNORMAL
LYMPHOCYTES # BLD AUTO: 1.59 10*3/MM3 (ref 0.7–3.1)
LYMPHOCYTES NFR BLD AUTO: 23.1 % (ref 19.6–45.3)
MAGNESIUM SERPL-MCNC: 2 MG/DL (ref 1.6–2.6)
MCH RBC QN AUTO: 31.5 PG (ref 26.6–33)
MCHC RBC AUTO-ENTMCNC: 33.3 G/DL (ref 31.5–35.7)
MCV RBC AUTO: 94.4 FL (ref 79–97)
METHADONE UR QL SCN: NEGATIVE
MONOCYTES # BLD AUTO: 0.79 10*3/MM3 (ref 0.1–0.9)
MONOCYTES NFR BLD AUTO: 11.5 % (ref 5–12)
NEUTROPHILS # BLD AUTO: 4.1 10*3/MM3 (ref 1.7–7)
NEUTROPHILS NFR BLD AUTO: 59.6 % (ref 42.7–76)
NITRITE UR QL STRIP: NEGATIVE
NRBC BLD AUTO-RTO: 0 /100 WBC (ref 0–0.2)
NT-PROBNP SERPL-MCNC: 28.1 PG/ML (ref 5–900)
OPIATES UR QL: NEGATIVE
OXYCODONE UR QL SCN: NEGATIVE
PCP UR QL SCN: NEGATIVE
PH UR STRIP.AUTO: 6 [PH] (ref 5–8)
PLATELET # BLD AUTO: 190 10*3/MM3 (ref 140–450)
PMV BLD AUTO: 9.1 FL (ref 6–12)
POTASSIUM BLD-SCNC: 3.3 MMOL/L (ref 3.5–5.2)
PROT SERPL-MCNC: 8.3 G/DL (ref 6–8.5)
PROT UR QL STRIP: NEGATIVE
PROTHROMBIN TIME: 13.2 SECONDS (ref 11–15.4)
RBC # BLD AUTO: 4.99 10*6/MM3 (ref 3.77–5.28)
RBC # UR: ABNORMAL /HPF
REF LAB TEST METHOD: ABNORMAL
SODIUM BLD-SCNC: 138 MMOL/L (ref 136–145)
SP GR UR STRIP: 1.01 (ref 1–1.03)
SQUAMOUS #/AREA URNS HPF: ABNORMAL /HPF
TROPONIN T SERPL-MCNC: <0.01 NG/ML (ref 0–0.03)
TROPONIN T SERPL-MCNC: <0.01 NG/ML (ref 0–0.03)
UROBILINOGEN UR QL STRIP: ABNORMAL
WBC NRBC COR # BLD: 6.88 10*3/MM3 (ref 3.4–10.8)
WBC UR QL AUTO: ABNORMAL /HPF
WHOLE BLOOD HOLD SPECIMEN: NORMAL
WHOLE BLOOD HOLD SPECIMEN: NORMAL

## 2019-12-01 PROCEDURE — 80307 DRUG TEST PRSMV CHEM ANLYZR: CPT | Performed by: FAMILY MEDICINE

## 2019-12-01 PROCEDURE — 74176 CT ABD & PELVIS W/O CONTRAST: CPT | Performed by: RADIOLOGY

## 2019-12-01 PROCEDURE — 85025 COMPLETE CBC W/AUTO DIFF WBC: CPT | Performed by: FAMILY MEDICINE

## 2019-12-01 PROCEDURE — 93005 ELECTROCARDIOGRAM TRACING: CPT | Performed by: FAMILY MEDICINE

## 2019-12-01 PROCEDURE — 93005 ELECTROCARDIOGRAM TRACING: CPT | Performed by: PHYSICIAN ASSISTANT

## 2019-12-01 PROCEDURE — 99284 EMERGENCY DEPT VISIT MOD MDM: CPT

## 2019-12-01 PROCEDURE — 71045 X-RAY EXAM CHEST 1 VIEW: CPT | Performed by: RADIOLOGY

## 2019-12-01 PROCEDURE — 85610 PROTHROMBIN TIME: CPT | Performed by: FAMILY MEDICINE

## 2019-12-01 PROCEDURE — 36415 COLL VENOUS BLD VENIPUNCTURE: CPT

## 2019-12-01 PROCEDURE — 71275 CT ANGIOGRAPHY CHEST: CPT | Performed by: RADIOLOGY

## 2019-12-01 PROCEDURE — 83605 ASSAY OF LACTIC ACID: CPT | Performed by: FAMILY MEDICINE

## 2019-12-01 PROCEDURE — 81001 URINALYSIS AUTO W/SCOPE: CPT | Performed by: FAMILY MEDICINE

## 2019-12-01 PROCEDURE — 76830 TRANSVAGINAL US NON-OB: CPT | Performed by: RADIOLOGY

## 2019-12-01 PROCEDURE — 74176 CT ABD & PELVIS W/O CONTRAST: CPT

## 2019-12-01 PROCEDURE — 80053 COMPREHEN METABOLIC PANEL: CPT | Performed by: FAMILY MEDICINE

## 2019-12-01 PROCEDURE — 71275 CT ANGIOGRAPHY CHEST: CPT

## 2019-12-01 PROCEDURE — 84484 ASSAY OF TROPONIN QUANT: CPT | Performed by: FAMILY MEDICINE

## 2019-12-01 PROCEDURE — 85379 FIBRIN DEGRADATION QUANT: CPT | Performed by: FAMILY MEDICINE

## 2019-12-01 PROCEDURE — 71045 X-RAY EXAM CHEST 1 VIEW: CPT

## 2019-12-01 PROCEDURE — 0 IOVERSOL 74 % SOLUTION: Performed by: FAMILY MEDICINE

## 2019-12-01 PROCEDURE — 83735 ASSAY OF MAGNESIUM: CPT | Performed by: FAMILY MEDICINE

## 2019-12-01 PROCEDURE — 76830 TRANSVAGINAL US NON-OB: CPT

## 2019-12-01 PROCEDURE — 83880 ASSAY OF NATRIURETIC PEPTIDE: CPT | Performed by: FAMILY MEDICINE

## 2019-12-01 RX ORDER — ASPIRIN 81 MG/1
81 TABLET, CHEWABLE ORAL DAILY
COMMUNITY

## 2019-12-01 RX ORDER — SODIUM CHLORIDE 0.9 % (FLUSH) 0.9 %
10 SYRINGE (ML) INJECTION AS NEEDED
Status: DISCONTINUED | OUTPATIENT
Start: 2019-12-01 | End: 2019-12-01 | Stop reason: HOSPADM

## 2019-12-01 RX ORDER — ASPIRIN 81 MG/1
324 TABLET, CHEWABLE ORAL ONCE
Status: COMPLETED | OUTPATIENT
Start: 2019-12-01 | End: 2019-12-01

## 2019-12-01 RX ORDER — GABAPENTIN 800 MG/1
800 TABLET ORAL 3 TIMES DAILY
COMMUNITY

## 2019-12-01 RX ORDER — CLONAZEPAM 0.5 MG/1
0.5 TABLET ORAL 2 TIMES DAILY PRN
COMMUNITY

## 2019-12-01 RX ORDER — POTASSIUM CHLORIDE 20 MEQ/1
40 TABLET, EXTENDED RELEASE ORAL DAILY
Status: DISCONTINUED | OUTPATIENT
Start: 2019-12-01 | End: 2019-12-01 | Stop reason: HOSPADM

## 2019-12-01 RX ORDER — CEPHALEXIN 500 MG/1
500 CAPSULE ORAL 2 TIMES DAILY
Qty: 14 CAPSULE | Refills: 0 | Status: SHIPPED | OUTPATIENT
Start: 2019-12-01

## 2019-12-01 RX ORDER — OMEPRAZOLE 40 MG/1
40 CAPSULE, DELAYED RELEASE ORAL DAILY
COMMUNITY

## 2019-12-01 RX ADMIN — IOVERSOL 75 ML: 741 INJECTION INTRA-ARTERIAL; INTRAVENOUS at 12:07

## 2019-12-01 RX ADMIN — ASPIRIN 324 MG: 81 TABLET, CHEWABLE ORAL at 11:23

## 2019-12-01 RX ADMIN — POTASSIUM CHLORIDE 40 MEQ: 1500 TABLET, EXTENDED RELEASE ORAL at 12:19

## 2019-12-01 NOTE — ED PROVIDER NOTES
Subjective   54-year-old female with a history of COPD anxiety arthritis presents the emergency room with complaints of chest pain for the past year.  Patient reports that she has had intermittent chest pain over the past year.  She states that she usually has on a daily basis.  She reports the pain is made worse with laying flat.  She states she is had nausea but denies nausea with her chest pain specifically she states she is had nausea and vomiting that she attributes to lower abdominal pain this been ongoing for the past 5 months.  She states that she is scheduled to have a ultrasound of her ovaries but reports that she could not wait.  She reports that she contacted her primary care provider today and was instructed to come to the emergency room.  Patient states she does have shortness of breath but feels that she is aware shortness of breath secondary to her COPD.  Patient states she was seen in the hospital a year ago for chest pain and was noted to have a stress test that returned unremarkable.  She states she has had again ongoing chest pain since that time.  She states she has seen her family doctor for her chest discomfort as well.  She states she last saw her family doctor 1 week ago.        Chest Pain   Pain location:  Substernal area  Pain quality: aching    Pain radiates to:  Does not radiate  Pain severity:  Mild  Duration:  12 months  Timing:  Intermittent  Progression:  Waxing and waning  Associated symptoms: abdominal pain and nausea    Associated symptoms: no altered mental status, no anxiety, no claudication, no cough, no diaphoresis, no dizziness, no dysphagia, no fatigue, no fever, no lower extremity edema, no near-syncope, no numbness and no palpitations        Review of Systems   Constitutional: Negative for diaphoresis, fatigue and fever.   HENT: Negative for trouble swallowing.    Respiratory: Negative for cough.    Cardiovascular: Positive for chest pain. Negative for palpitations,  claudication and near-syncope.   Gastrointestinal: Positive for abdominal pain and nausea.   Genitourinary: Negative for flank pain.   Musculoskeletal: Negative for myalgias.   Skin: Negative for rash and wound.   Neurological: Negative for dizziness and numbness.   All other systems reviewed and are negative.      Past Medical History:   Diagnosis Date   • Anxiety    • Arthritis    • Asthma    • Atrial fibrillation (CMS/HCC)    • COPD (chronic obstructive pulmonary disease) (CMS/HCC)    • Fibromyalgia    • Headache    • Osteoporosis        Allergies   Allergen Reactions   • Biaxin [Clarithromycin]    • Codeine    • Darvon [Propoxyphene]    • Percocet [Oxycodone-Acetaminophen]    • Tramadol Nausea And Vomiting       Past Surgical History:   Procedure Laterality Date   • CHOLECYSTECTOMY  1992   • COLONOSCOPY     • ENDOMETRIAL ABLATION  1990   • ENDOSCOPY     • SHOULDER ROTATOR CUFF REPAIR  2009   • TUBAL ABDOMINAL LIGATION         Family History   Problem Relation Age of Onset   • Cancer Mother    • Cancer Father    • Lung cancer Other    • Coronary artery disease Sister        Social History     Socioeconomic History   • Marital status:      Spouse name: Not on file   • Number of children: Not on file   • Years of education: Not on file   • Highest education level: Not on file   Tobacco Use   • Smoking status: Current Every Day Smoker     Packs/day: 1.00   • Smokeless tobacco: Never Used   • Tobacco comment: encouraged cessation, declined   Substance and Sexual Activity   • Alcohol use: Yes     Comment: occasionaly   • Drug use: No   • Sexual activity: Defer           Objective   Physical Exam   Constitutional: She is oriented to person, place, and time. She appears well-developed and well-nourished. No distress.   Eyes: Pupils are equal, round, and reactive to light. EOM are normal.   Neck: Neck supple. No JVD present.   Cardiovascular: Normal rate, regular rhythm and normal heart sounds. Exam reveals no  friction rub.   No murmur heard.  Pulmonary/Chest: Effort normal and breath sounds normal. No respiratory distress. She has no wheezes. She has no rales.   Abdominal: Soft. Bowel sounds are normal. There is no tenderness. There is no rebound and no guarding.   Musculoskeletal: Normal range of motion. She exhibits no edema.   Neurological: She is alert and oriented to person, place, and time. No cranial nerve deficit.   Skin: Skin is warm and dry. No rash noted.   Psychiatric: She has a normal mood and affect. Her behavior is normal.   Nursing note and vitals reviewed.      Procedures           ED Course  ED Course as of Dec 01 1414   Sun Dec 01, 2019   1115 EKG normal sinus rhythm.  Ventricular 86.  .  QRS 84.  QTc 502.  Normal axis.  No ST elevation.  [BB]   1130 Patient white blood cell count 6.8 hemoglobin 15.7 47.1.  [BB]   1143 Patient urinalysis with trace leukocyte esterase.  [BB]   1148 Patient with elevated d-dimer 0.60.  Patient's first troponin less than 0.010.  Have ordered CT scan of chest PE protocol.  [BB]   1155 Patient's potassium was low at 3.3 have ordered oral replacement.  [BB]   1155 Patient's LFTs are elevated however they are down from previous checks.  They have been elevated over the past year.  [BB]   1159 Patient's lactic acid is unremarkable.  [BB]   1200 CT scan of abdomen pelvis is unremarkable.  [BB]   1208 Patient urine drug screen positive for amphetamines.  [BB]   1224 CT scan of chest shows no pulmonary embolism findings of COPD.  [BB]   1404 Pelvic ultrasound patient status post hysterectomy.  Patient with no complex mass appreciated.  [BB]   1404 Show 2 sets of cardiac enzymes have returned unremarkable.  [BB]   1411 Have discussed need for follow-up with primary care provider given her symptoms been ongoing for a year.  Have also discussed need to follow-up with her family doctor for the abdominal pain is been ongoing for 5 months.  Patient verbalized understanding of  discussed warning signs informed emergency room patient verbalized understanding.  [BB]      ED Course User Index  [BB] Maik Grier MD                  MDM  Number of Diagnoses or Management Options  Acute cystitis with hematuria: minor  Atypical chest pain: established and improving  Lower abdominal pain: established and improving     Amount and/or Complexity of Data Reviewed  Clinical lab tests: ordered and reviewed  Tests in the radiology section of CPT®: reviewed and ordered  Decide to obtain previous medical records or to obtain history from someone other than the patient: yes    Risk of Complications, Morbidity, and/or Mortality  Presenting problems: moderate  Diagnostic procedures: moderate  Management options: moderate    Patient Progress  Patient progress: stable      Final diagnoses:   Atypical chest pain   Lower abdominal pain   Acute cystitis with hematuria              Maik Grier MD  12/01/19 1418       Maik Grier MD  12/22/19 9870

## 2020-03-23 ENCOUNTER — TRANSCRIBE ORDERS (OUTPATIENT)
Dept: ADMINISTRATIVE | Facility: HOSPITAL | Age: 55
End: 2020-03-23

## 2020-03-23 ENCOUNTER — LAB (OUTPATIENT)
Dept: LAB | Facility: HOSPITAL | Age: 55
End: 2020-03-23

## 2020-03-23 DIAGNOSIS — R06.02 SOB (SHORTNESS OF BREATH): ICD-10-CM

## 2020-03-23 DIAGNOSIS — R05.9 COUGH: ICD-10-CM

## 2020-03-23 DIAGNOSIS — R50.9 FEVER, UNSPECIFIED FEVER CAUSE: ICD-10-CM

## 2020-03-23 DIAGNOSIS — R05.9 COUGH: Primary | ICD-10-CM

## 2020-03-23 PROCEDURE — 87635 SARS-COV-2 COVID-19 AMP PRB: CPT

## 2020-03-23 PROCEDURE — U0003 INFECTIOUS AGENT DETECTION BY NUCLEIC ACID (DNA OR RNA); SEVERE ACUTE RESPIRATORY SYNDROME CORONAVIRUS 2 (SARS-COV-2) (CORONAVIRUS DISEASE [COVID-19]), AMPLIFIED PROBE TECHNIQUE, MAKING USE OF HIGH THROUGHPUT TECHNOLOGIES AS DESCRIBED BY CMS-2020-01-R: HCPCS

## 2020-04-04 LAB — SARS-COV-2 RNA RESP QL NAA+PROBE: NOT DETECTED

## 2020-06-05 ENCOUNTER — HOSPITAL ENCOUNTER (OUTPATIENT)
Dept: GENERAL RADIOLOGY | Facility: HOSPITAL | Age: 55
Discharge: HOME | End: 2020-06-05
Admitting: NURSE PRACTITIONER

## 2020-06-05 ENCOUNTER — TRANSCRIBE ORDERS (OUTPATIENT)
Dept: OTHER | Facility: OTHER | Age: 55
End: 2020-06-05

## 2020-06-05 DIAGNOSIS — M54.2 NECK PAIN: ICD-10-CM

## 2020-06-05 DIAGNOSIS — M54.2 NECK PAIN: Primary | ICD-10-CM

## 2020-06-05 PROCEDURE — 72050 X-RAY EXAM NECK SPINE 4/5VWS: CPT

## 2020-06-05 PROCEDURE — 72050 X-RAY EXAM NECK SPINE 4/5VWS: CPT | Performed by: RADIOLOGY

## 2020-09-02 ENCOUNTER — HOSPITAL ENCOUNTER (OUTPATIENT)
Dept: GENERAL RADIOLOGY | Facility: HOSPITAL | Age: 55
Discharge: HOME | End: 2020-09-02
Admitting: NURSE PRACTITIONER

## 2020-09-02 ENCOUNTER — TRANSCRIBE ORDERS (OUTPATIENT)
Dept: ADMINISTRATIVE | Facility: HOSPITAL | Age: 55
End: 2020-09-02

## 2020-09-02 DIAGNOSIS — M54.50 LOW BACK PAIN, UNSPECIFIED BACK PAIN LATERALITY, UNSPECIFIED CHRONICITY, UNSPECIFIED WHETHER SCIATICA PRESENT: Primary | ICD-10-CM

## 2020-09-02 DIAGNOSIS — M54.50 LOW BACK PAIN, UNSPECIFIED BACK PAIN LATERALITY, UNSPECIFIED CHRONICITY, UNSPECIFIED WHETHER SCIATICA PRESENT: ICD-10-CM

## 2020-09-02 PROCEDURE — 72110 X-RAY EXAM L-2 SPINE 4/>VWS: CPT | Performed by: RADIOLOGY

## 2020-09-02 PROCEDURE — 72110 X-RAY EXAM L-2 SPINE 4/>VWS: CPT

## 2020-10-11 ENCOUNTER — HOSPITAL ENCOUNTER (EMERGENCY)
Facility: HOSPITAL | Age: 55
Discharge: HOME | End: 2020-10-11
Admitting: EMERGENCY MEDICINE

## 2020-10-11 ENCOUNTER — APPOINTMENT (OUTPATIENT)
Dept: CT IMAGING | Facility: HOSPITAL | Age: 55
End: 2020-10-11

## 2020-10-11 VITALS
OXYGEN SATURATION: 96 % | RESPIRATION RATE: 16 BRPM | WEIGHT: 176 LBS | HEART RATE: 95 BPM | DIASTOLIC BLOOD PRESSURE: 83 MMHG | HEIGHT: 63 IN | BODY MASS INDEX: 31.18 KG/M2 | TEMPERATURE: 98 F | SYSTOLIC BLOOD PRESSURE: 127 MMHG

## 2020-10-11 DIAGNOSIS — K52.9 COLITIS: Primary | ICD-10-CM

## 2020-10-11 LAB
6-ACETYL MORPHINE: NEGATIVE
ALBUMIN SERPL-MCNC: 2.92 G/DL (ref 3.5–5.2)
ALBUMIN/GLOB SERPL: 0.6 G/DL
ALP SERPL-CCNC: 159 U/L (ref 39–117)
ALT SERPL W P-5'-P-CCNC: 35 U/L (ref 1–33)
AMPHET+METHAMPHET UR QL: NEGATIVE
AMYLASE SERPL-CCNC: 30 U/L (ref 28–100)
ANION GAP SERPL CALCULATED.3IONS-SCNC: 6.3 MMOL/L (ref 5–15)
AST SERPL-CCNC: 61 U/L (ref 1–32)
B-HCG UR QL: NEGATIVE
BARBITURATES UR QL SCN: NEGATIVE
BASOPHILS # BLD AUTO: 0.09 10*3/MM3 (ref 0–0.2)
BASOPHILS NFR BLD AUTO: 0.9 % (ref 0–1.5)
BENZODIAZ UR QL SCN: NEGATIVE
BILIRUB SERPL-MCNC: 0.9 MG/DL (ref 0–1.2)
BILIRUB UR QL STRIP: NEGATIVE
BUN SERPL-MCNC: 9 MG/DL (ref 6–20)
BUN/CREAT SERPL: 15.8 (ref 7–25)
BUPRENORPHINE SERPL-MCNC: NEGATIVE NG/ML
CALCIUM SPEC-SCNC: 8.5 MG/DL (ref 8.6–10.5)
CANNABINOIDS SERPL QL: NEGATIVE
CHLORIDE SERPL-SCNC: 101 MMOL/L (ref 98–107)
CLARITY UR: CLEAR
CO2 SERPL-SCNC: 28.7 MMOL/L (ref 22–29)
COCAINE UR QL: NEGATIVE
COLOR UR: YELLOW
CREAT SERPL-MCNC: 0.57 MG/DL (ref 0.57–1)
DEPRECATED RDW RBC AUTO: 46.3 FL (ref 37–54)
EOSINOPHIL # BLD AUTO: 0.47 10*3/MM3 (ref 0–0.4)
EOSINOPHIL NFR BLD AUTO: 4.9 % (ref 0.3–6.2)
ERYTHROCYTE [DISTWIDTH] IN BLOOD BY AUTOMATED COUNT: 13 % (ref 12.3–15.4)
GFR SERPL CREATININE-BSD FRML MDRD: 111 ML/MIN/1.73
GLOBULIN UR ELPH-MCNC: 4.6 GM/DL
GLUCOSE SERPL-MCNC: 108 MG/DL (ref 65–99)
GLUCOSE UR STRIP-MCNC: NEGATIVE MG/DL
HCT VFR BLD AUTO: 45.1 % (ref 34–46.6)
HGB BLD-MCNC: 15 G/DL (ref 12–15.9)
HGB UR QL STRIP.AUTO: NEGATIVE
HOLD SPECIMEN: NORMAL
HOLD SPECIMEN: NORMAL
IMM GRANULOCYTES # BLD AUTO: 0.06 10*3/MM3 (ref 0–0.05)
IMM GRANULOCYTES NFR BLD AUTO: 0.6 % (ref 0–0.5)
KETONES UR QL STRIP: NEGATIVE
LEUKOCYTE ESTERASE UR QL STRIP.AUTO: NEGATIVE
LIPASE SERPL-CCNC: 19 U/L (ref 13–60)
LYMPHOCYTES # BLD AUTO: 1.88 10*3/MM3 (ref 0.7–3.1)
LYMPHOCYTES NFR BLD AUTO: 19.6 % (ref 19.6–45.3)
MCH RBC QN AUTO: 32.2 PG (ref 26.6–33)
MCHC RBC AUTO-ENTMCNC: 33.3 G/DL (ref 31.5–35.7)
MCV RBC AUTO: 96.8 FL (ref 79–97)
METHADONE UR QL SCN: NEGATIVE
MONOCYTES # BLD AUTO: 1.53 10*3/MM3 (ref 0.1–0.9)
MONOCYTES NFR BLD AUTO: 16 % (ref 5–12)
NEUTROPHILS NFR BLD AUTO: 5.56 10*3/MM3 (ref 1.7–7)
NEUTROPHILS NFR BLD AUTO: 58 % (ref 42.7–76)
NITRITE UR QL STRIP: NEGATIVE
NRBC BLD AUTO-RTO: 0 /100 WBC (ref 0–0.2)
NT-PROBNP SERPL-MCNC: 69.1 PG/ML (ref 0–900)
OPIATES UR QL: NEGATIVE
OXYCODONE UR QL SCN: NEGATIVE
PCP UR QL SCN: NEGATIVE
PH UR STRIP.AUTO: 7.5 [PH] (ref 5–8)
PLATELET # BLD AUTO: 230 10*3/MM3 (ref 140–450)
PMV BLD AUTO: 9 FL (ref 6–12)
POTASSIUM SERPL-SCNC: 3.6 MMOL/L (ref 3.5–5.2)
PROT SERPL-MCNC: 7.5 G/DL (ref 6–8.5)
PROT UR QL STRIP: NEGATIVE
RBC # BLD AUTO: 4.66 10*6/MM3 (ref 3.77–5.28)
SODIUM SERPL-SCNC: 136 MMOL/L (ref 136–145)
SP GR UR STRIP: 1.01 (ref 1–1.03)
TROPONIN T SERPL-MCNC: <0.01 NG/ML (ref 0–0.03)
UROBILINOGEN UR QL STRIP: NORMAL
WBC # BLD AUTO: 9.59 10*3/MM3 (ref 3.4–10.8)
WHOLE BLOOD HOLD SPECIMEN: NORMAL
WHOLE BLOOD HOLD SPECIMEN: NORMAL

## 2020-10-11 PROCEDURE — 25010000002 IOPAMIDOL 61 % SOLUTION: Performed by: PHYSICIAN ASSISTANT

## 2020-10-11 PROCEDURE — 80307 DRUG TEST PRSMV CHEM ANLYZR: CPT | Performed by: PHYSICIAN ASSISTANT

## 2020-10-11 PROCEDURE — 83690 ASSAY OF LIPASE: CPT | Performed by: PHYSICIAN ASSISTANT

## 2020-10-11 PROCEDURE — 81003 URINALYSIS AUTO W/O SCOPE: CPT | Performed by: PHYSICIAN ASSISTANT

## 2020-10-11 PROCEDURE — 85025 COMPLETE CBC W/AUTO DIFF WBC: CPT | Performed by: PHYSICIAN ASSISTANT

## 2020-10-11 PROCEDURE — 83880 ASSAY OF NATRIURETIC PEPTIDE: CPT | Performed by: PHYSICIAN ASSISTANT

## 2020-10-11 PROCEDURE — 84484 ASSAY OF TROPONIN QUANT: CPT | Performed by: PHYSICIAN ASSISTANT

## 2020-10-11 PROCEDURE — 81025 URINE PREGNANCY TEST: CPT | Performed by: PHYSICIAN ASSISTANT

## 2020-10-11 PROCEDURE — 74177 CT ABD & PELVIS W/CONTRAST: CPT

## 2020-10-11 PROCEDURE — 80053 COMPREHEN METABOLIC PANEL: CPT | Performed by: PHYSICIAN ASSISTANT

## 2020-10-11 PROCEDURE — 99284 EMERGENCY DEPT VISIT MOD MDM: CPT

## 2020-10-11 PROCEDURE — 82150 ASSAY OF AMYLASE: CPT | Performed by: PHYSICIAN ASSISTANT

## 2020-10-11 RX ORDER — SODIUM CHLORIDE 0.9 % (FLUSH) 0.9 %
10 SYRINGE (ML) INJECTION AS NEEDED
Status: DISCONTINUED | OUTPATIENT
Start: 2020-10-11 | End: 2020-10-11 | Stop reason: HOSPADM

## 2020-10-11 RX ORDER — PROMETHAZINE HYDROCHLORIDE 12.5 MG/1
12.5 TABLET ORAL EVERY 8 HOURS PRN
Qty: 15 TABLET | Refills: 0 | Status: SHIPPED | OUTPATIENT
Start: 2020-10-11

## 2020-10-11 RX ORDER — METRONIDAZOLE 250 MG/1
500 TABLET ORAL ONCE
Status: COMPLETED | OUTPATIENT
Start: 2020-10-11 | End: 2020-10-11

## 2020-10-11 RX ORDER — ONDANSETRON 2 MG/ML
4 INJECTION INTRAMUSCULAR; INTRAVENOUS ONCE
Status: DISCONTINUED | OUTPATIENT
Start: 2020-10-11 | End: 2020-10-11 | Stop reason: HOSPADM

## 2020-10-11 RX ORDER — METRONIDAZOLE 500 MG/1
500 TABLET ORAL 2 TIMES DAILY
Qty: 14 TABLET | Refills: 0 | Status: SHIPPED | OUTPATIENT
Start: 2020-10-11 | End: 2020-10-18

## 2020-10-11 RX ADMIN — IOPAMIDOL 100 ML: 612 INJECTION, SOLUTION INTRAVENOUS at 17:59

## 2020-10-11 RX ADMIN — SODIUM CHLORIDE 1000 ML: 9 INJECTION, SOLUTION INTRAVENOUS at 16:00

## 2020-10-11 RX ADMIN — METRONIDAZOLE 500 MG: 250 TABLET ORAL at 19:18

## 2020-10-11 NOTE — ED PROVIDER NOTES
Subjective   54 y/o female patient presents to the ED with complaints of generalized abdominal pain x 1 week. Patient states that her stool has been orange in color and she thinks it is blood. Patient denies any fevers, N/V/D. Patient denies any chest pain or SOB. PMHX - COPD, HTN, A fib, and arthritis.       History provided by:  Patient   used: No        Review of Systems   Constitutional: Negative.    HENT: Negative.    Eyes: Negative.    Respiratory: Negative.    Cardiovascular: Negative.    Gastrointestinal: Positive for abdominal pain and blood in stool.   Endocrine: Negative.    Genitourinary: Negative.    Musculoskeletal: Negative.    Skin: Negative.    Allergic/Immunologic: Negative.    Neurological: Negative.    Hematological: Negative.    Psychiatric/Behavioral: Negative.    All other systems reviewed and are negative.      Past Medical History:   Diagnosis Date   • Anxiety    • Arthritis    • Asthma    • Atrial fibrillation (CMS/HCC)    • COPD (chronic obstructive pulmonary disease) (CMS/HCC)    • Fibromyalgia    • Headache    • Osteoporosis        Allergies   Allergen Reactions   • Biaxin [Clarithromycin]    • Codeine    • Darvon [Propoxyphene]    • Percocet [Oxycodone-Acetaminophen]    • Tramadol Nausea And Vomiting       Past Surgical History:   Procedure Laterality Date   • CHOLECYSTECTOMY  1992   • COLONOSCOPY     • ENDOMETRIAL ABLATION  1990   • ENDOSCOPY     • SHOULDER ROTATOR CUFF REPAIR  2009   • TUBAL ABDOMINAL LIGATION         Family History   Problem Relation Age of Onset   • Cancer Mother    • Cancer Father    • Lung cancer Other    • Coronary artery disease Sister        Social History     Socioeconomic History   • Marital status:      Spouse name: Not on file   • Number of children: Not on file   • Years of education: Not on file   • Highest education level: Not on file   Tobacco Use   • Smoking status: Current Every Day Smoker     Packs/day: 1.00   • Smokeless  tobacco: Never Used   • Tobacco comment: encouraged cessation, declined   Substance and Sexual Activity   • Alcohol use: Yes     Comment: occasionaly   • Drug use: No   • Sexual activity: Defer           Objective   Physical Exam  Vitals signs and nursing note reviewed.   Constitutional:       Appearance: She is well-developed. She is obese.   HENT:      Head: Normocephalic and atraumatic.      Mouth/Throat:      Mouth: Mucous membranes are moist.      Pharynx: Oropharynx is clear.   Eyes:      Extraocular Movements: Extraocular movements intact.      Pupils: Pupils are equal, round, and reactive to light.   Cardiovascular:      Rate and Rhythm: Normal rate and regular rhythm.      Heart sounds: Normal heart sounds.   Pulmonary:      Effort: Pulmonary effort is normal.      Breath sounds: Normal breath sounds.   Abdominal:      General: Abdomen is flat. Bowel sounds are normal.      Palpations: Abdomen is soft.      Tenderness: There is generalized abdominal tenderness.   Skin:     General: Skin is warm and dry.      Capillary Refill: Capillary refill takes less than 2 seconds.   Neurological:      General: No focal deficit present.      Mental Status: She is alert and oriented to person, place, and time.         Procedures           ED Course  ED Course as of Oct 11 1900   Sun Oct 11, 2020   1818 IMPRESSION:     Note that this exam states that it was performed with IV contrast but I see no evidence of IV contrast on this study. Correlate for evidence of extravasation at injection site.     1.  Bowel wall thickening and inflammatory changes involving the cecum and ascending colon to the level of the hepatic flexure, likely reflecting colitis.  2.  Subtle nodular contours of the liver suggesting hepatic cirrhosis. Sequelae of portal hypertension in the upper abdomen including upper abdominal varices and splenomegaly.  3.  Abdominal aortic aneurysm measuring up to 3.9 cm.           Signer Name: AAYUSH PRICE MD   Signed:  10/11/2020 6:15 PM   Workstation Name: [id number]    [facility]   CT Abdomen Pelvis With Contrast [ML]   1856 Per Rad Tech- contrast dye triggered early therefore there was not as much dye in the study as typical. However, contrast dye is visible on exam.     [ML]   1859 Pt stay in ED has been uneventful. She has had no vomiting or able to produce stool during visit. Patient instructed to f/u with PCP. Discussed sx that would warrnat return to the ED.     [ML]      ED Course User Index  [ML] Ana Jones PA                                           MDM  Number of Diagnoses or Management Options  Colitis:      Amount and/or Complexity of Data Reviewed  Clinical lab tests: ordered and reviewed  Tests in the radiology section of CPT®: ordered and reviewed    Risk of Complications, Morbidity, and/or Mortality  Presenting problems: low  Diagnostic procedures: low  Management options: low    Patient Progress  Patient progress: improved      Final diagnoses:   Colitis            Ana Jones PA  10/11/20 1908

## 2020-10-11 NOTE — ED NOTES
Pt declined zofran, states it makes her sick and she can only take phenergan.       Sofia Jackson RN  10/11/20 0241

## 2020-11-02 ENCOUNTER — HOSPITAL ENCOUNTER (EMERGENCY)
Facility: HOSPITAL | Age: 55
Discharge: HOME | End: 2020-11-02
Attending: FAMILY MEDICINE | Admitting: FAMILY MEDICINE

## 2020-11-02 ENCOUNTER — APPOINTMENT (OUTPATIENT)
Dept: GENERAL RADIOLOGY | Facility: HOSPITAL | Age: 55
End: 2020-11-02

## 2020-11-02 VITALS
SYSTOLIC BLOOD PRESSURE: 118 MMHG | RESPIRATION RATE: 18 BRPM | OXYGEN SATURATION: 100 % | HEIGHT: 62 IN | HEART RATE: 102 BPM | DIASTOLIC BLOOD PRESSURE: 81 MMHG | WEIGHT: 170 LBS | BODY MASS INDEX: 31.28 KG/M2 | TEMPERATURE: 98 F

## 2020-11-02 DIAGNOSIS — S66.912A HAND STRAIN, LEFT, INITIAL ENCOUNTER: Primary | ICD-10-CM

## 2020-11-02 PROCEDURE — 73110 X-RAY EXAM OF WRIST: CPT

## 2020-11-02 PROCEDURE — 73110 X-RAY EXAM OF WRIST: CPT | Performed by: RADIOLOGY

## 2020-11-02 PROCEDURE — 96372 THER/PROPH/DIAG INJ SC/IM: CPT

## 2020-11-02 PROCEDURE — 73130 X-RAY EXAM OF HAND: CPT

## 2020-11-02 PROCEDURE — 99283 EMERGENCY DEPT VISIT LOW MDM: CPT

## 2020-11-02 PROCEDURE — 73130 X-RAY EXAM OF HAND: CPT | Performed by: RADIOLOGY

## 2020-11-02 PROCEDURE — 25010000002 MORPHINE PER 10 MG: Performed by: FAMILY MEDICINE

## 2020-11-02 RX ORDER — HYDROCODONE BITARTRATE AND ACETAMINOPHEN 10; 325 MG/1; MG/1
1 TABLET ORAL ONCE
Status: DISCONTINUED | OUTPATIENT
Start: 2020-11-02 | End: 2020-11-02

## 2020-11-02 RX ORDER — ONDANSETRON 4 MG/1
4 TABLET, ORALLY DISINTEGRATING ORAL ONCE
Status: DISCONTINUED | OUTPATIENT
Start: 2020-11-02 | End: 2020-11-02 | Stop reason: HOSPADM

## 2020-11-02 RX ADMIN — MORPHINE SULFATE 4 MG: 4 INJECTION, SOLUTION INTRAMUSCULAR; INTRAVENOUS at 12:56

## 2020-11-02 NOTE — ED PROVIDER NOTES
Subjective   55-year-old female presents to the emergency room with left hand pain.  Patient states she fell on Saturday and caught herself with her hand.  She denies any previous injury to her left hand.  Aggravating factors include movement.  Denies any alleviating factors.      History provided by:  Patient   used: No        Review of Systems   Constitutional: Negative.  Negative for fever.   HENT: Negative.    Respiratory: Negative.    Cardiovascular: Negative.  Negative for chest pain.   Gastrointestinal: Negative.  Negative for abdominal pain.   Endocrine: Negative.    Genitourinary: Negative.  Negative for dysuria.   Musculoskeletal:        (+) Left hand pain   Skin: Negative.    Neurological: Negative.    Psychiatric/Behavioral: Negative.    All other systems reviewed and are negative.      Past Medical History:   Diagnosis Date   • Anxiety    • Arthritis    • Asthma    • Atrial fibrillation (CMS/HCC)    • COPD (chronic obstructive pulmonary disease) (CMS/HCC)    • Fibromyalgia    • Headache    • Osteoporosis        Allergies   Allergen Reactions   • Biaxin [Clarithromycin]    • Codeine    • Darvon [Propoxyphene]    • Percocet [Oxycodone-Acetaminophen]    • Tramadol Nausea And Vomiting       Past Surgical History:   Procedure Laterality Date   • CHOLECYSTECTOMY  1992   • COLONOSCOPY     • ENDOMETRIAL ABLATION  1990   • ENDOSCOPY     • SHOULDER ROTATOR CUFF REPAIR  2009   • TUBAL ABDOMINAL LIGATION         Family History   Problem Relation Age of Onset   • Cancer Mother    • Cancer Father    • Lung cancer Other    • Coronary artery disease Sister        Social History     Socioeconomic History   • Marital status:      Spouse name: Not on file   • Number of children: Not on file   • Years of education: Not on file   • Highest education level: Not on file   Tobacco Use   • Smoking status: Current Every Day Smoker     Packs/day: 1.00   • Smokeless tobacco: Never Used   • Tobacco  comment: encouraged cessation, declined   Substance and Sexual Activity   • Alcohol use: Yes     Comment: occasionaly   • Drug use: No   • Sexual activity: Defer           Objective   Physical Exam  Vitals signs and nursing note reviewed.   Constitutional:       General: She is not in acute distress.     Appearance: She is well-developed. She is not diaphoretic.   HENT:      Head: Normocephalic and atraumatic.      Right Ear: External ear normal.      Left Ear: External ear normal.      Nose: Nose normal.   Eyes:      Conjunctiva/sclera: Conjunctivae normal.      Pupils: Pupils are equal, round, and reactive to light.   Neck:      Musculoskeletal: Normal range of motion and neck supple.      Vascular: No JVD.      Trachea: No tracheal deviation.   Cardiovascular:      Rate and Rhythm: Normal rate and regular rhythm.      Pulses:           Radial pulses are 3+ on the right side and 3+ on the left side.      Heart sounds: Normal heart sounds. No murmur.   Pulmonary:      Effort: Pulmonary effort is normal. No respiratory distress.      Breath sounds: Normal breath sounds. No wheezing.   Abdominal:      General: Bowel sounds are normal.      Palpations: Abdomen is soft.      Tenderness: There is no abdominal tenderness.   Musculoskeletal:         General: No deformity.      Left hand: She exhibits decreased range of motion, tenderness and swelling. She exhibits normal capillary refill.        Hands:       Comments: No anatomic snuffbox tenderness   Skin:     General: Skin is warm and dry.      Coloration: Skin is not pale.      Findings: No erythema or rash.   Neurological:      Mental Status: She is alert and oriented to person, place, and time.      Cranial Nerves: No cranial nerve deficit.   Psychiatric:         Behavior: Behavior normal.         Thought Content: Thought content normal.         Splint - Cast - Strapping    Date/Time: 11/2/2020 1:29 PM  Performed by: Sulaiman Bay PA-C  Authorized by: Dez  Cleo Robles DO     Consent:     Consent obtained:  Verbal    Consent given by:  Patient    Risks discussed:  Discoloration, numbness, pain and swelling    Alternatives discussed:  No treatment, delayed treatment, alternative treatment, observation and referral  Universal protocol:     Procedure explained and questions answered to patient or proxy's satisfaction: yes      Relevant documents present and verified: yes      Test results available and properly labeled: yes      Imaging studies available: yes      Required blood products, implants, devices, and special equipment available: yes      Site/side marked: yes      Immediately prior to procedure a time out was called: yes      Patient identity confirmed:  Verbally with patient, arm band, provided demographic data and hospital-assigned identification number  Pre-procedure details:     Sensation:  Normal    Skin color:  Warm, pink  Procedure details:     Laterality:  Left    Location:  Hand    Hand:  L hand    Splint type:  Thumb spica    Supplies:  Cotton padding, elastic bandage and Ortho-Glass  Post-procedure details:     Pain:  Unchanged    Sensation:  Normal    Skin color:  Warm, pink    Patient tolerance of procedure:  Tolerated well, no immediate complications  Comments:      Patient tolerated splint application well, per tech. No acute complications. Neurovascularly intact.                 ED Course  ED Course as of Nov 02 1426   Mon Nov 02, 2020   1326 IMPRESSION:   No acute osseous or articular abnormalities.    This report was finalized on 11/2/2020 1:12 PM by Dr. Adalberto Juarez MD.    XR Wrist 3+ View Left [TK]   1327 IMPRESSION:   Degenerative changes and mild soft tissue edema. No displaced fracture identified.    This report was finalized on 11/2/2020 1:11 PM by Dr. Adalberto Juarez MD.    XR Hand 3+ View Left [TK]      ED Course User Index  [TK] Sulaiman Bay, SYDNEE                                           MDM  Number of Diagnoses or Management  Options  Hand strain, left, initial encounter: new and requires workup     Amount and/or Complexity of Data Reviewed  Tests in the radiology section of CPT®: reviewed and ordered    Risk of Complications, Morbidity, and/or Mortality  Presenting problems: moderate  Diagnostic procedures: moderate  Management options: moderate    Patient Progress  Patient progress: stable      Final diagnoses:   Hand strain, left, initial encounter            Sulaiman Bay PA-C  11/02/20 1426

## 2020-11-02 NOTE — ED NOTES
Pt states she is calling her ride and she will let us know when they get here     Ana Carrasco, RN  11/02/20 7865
